# Patient Record
Sex: FEMALE | Race: WHITE | NOT HISPANIC OR LATINO | Employment: OTHER | URBAN - METROPOLITAN AREA
[De-identification: names, ages, dates, MRNs, and addresses within clinical notes are randomized per-mention and may not be internally consistent; named-entity substitution may affect disease eponyms.]

---

## 2023-03-16 ENCOUNTER — OFFICE VISIT (OUTPATIENT)
Dept: PRIMARY CARE | Facility: CLINIC | Age: 63
End: 2023-03-16
Payer: COMMERCIAL

## 2023-03-16 VITALS
BODY MASS INDEX: 18.39 KG/M2 | SYSTOLIC BLOOD PRESSURE: 120 MMHG | TEMPERATURE: 97 F | WEIGHT: 128.47 LBS | HEART RATE: 87 BPM | DIASTOLIC BLOOD PRESSURE: 78 MMHG | HEIGHT: 70 IN | OXYGEN SATURATION: 98 % | RESPIRATION RATE: 16 BRPM

## 2023-03-16 DIAGNOSIS — R59.0 CERVICAL LYMPHADENOPATHY: Primary | ICD-10-CM

## 2023-03-16 DIAGNOSIS — Z00.00 PHYSICAL EXAM: ICD-10-CM

## 2023-03-16 DIAGNOSIS — R39.9 URINARY TRACT INFECTION SYMPTOMS: ICD-10-CM

## 2023-03-16 LAB
POC APPEARANCE, URINE: CLEAR
POC BILIRUBIN, URINE: NEGATIVE
POC BLOOD, URINE: NEGATIVE
POC COLOR, URINE: YELLOW
POC GLUCOSE, URINE: NEGATIVE MG/DL
POC KETONES, URINE: NEGATIVE MG/DL
POC LEUKOCYTES, URINE: NEGATIVE
POC NITRITE,URINE: NEGATIVE
POC PH, URINE: 7 PH
POC PROTEIN, URINE: NEGATIVE MG/DL
POC SPECIFIC GRAVITY, URINE: 1.01
POC UROBILINOGEN, URINE: 0.2 EU/DL

## 2023-03-16 PROCEDURE — 1036F TOBACCO NON-USER: CPT | Performed by: NURSE PRACTITIONER

## 2023-03-16 PROCEDURE — 81003 URINALYSIS AUTO W/O SCOPE: CPT | Performed by: NURSE PRACTITIONER

## 2023-03-16 PROCEDURE — 99204 OFFICE O/P NEW MOD 45 MIN: CPT | Performed by: NURSE PRACTITIONER

## 2023-03-16 RX ORDER — DULOXETIN HYDROCHLORIDE 60 MG/1
1 CAPSULE, DELAYED RELEASE ORAL DAILY
COMMUNITY
Start: 2022-12-02

## 2023-03-16 RX ORDER — AZITHROMYCIN 250 MG/1
TABLET, FILM COATED ORAL
Qty: 6 TABLET | Refills: 0 | Status: SHIPPED | OUTPATIENT
Start: 2023-03-16 | End: 2023-03-21

## 2023-03-16 RX ORDER — CLONAZEPAM 0.5 MG/1
TABLET ORAL
COMMUNITY
Start: 2022-12-02

## 2023-03-16 RX ORDER — MELATONIN 10 MG
TABLET, SUBLINGUAL SUBLINGUAL
COMMUNITY

## 2023-03-16 RX ORDER — ASPIRIN 81 MG/1
81 TABLET ORAL DAILY
COMMUNITY

## 2023-03-16 RX ORDER — HYDROXYZINE HYDROCHLORIDE 25 MG/1
1 TABLET, FILM COATED ORAL
COMMUNITY
Start: 2022-05-14

## 2023-03-16 RX ORDER — BUPROPION HYDROCHLORIDE 150 MG/1
150 TABLET ORAL DAILY
COMMUNITY

## 2023-03-16 RX ORDER — DULOXETIN HYDROCHLORIDE 30 MG/1
1 CAPSULE, DELAYED RELEASE ORAL DAILY
COMMUNITY
Start: 2022-12-27

## 2023-03-16 ASSESSMENT — ENCOUNTER SYMPTOMS
SORE THROAT: 0
FREQUENCY: 0
COUGH: 0
CHILLS: 0
FEVER: 0
NECK PAIN: 1

## 2023-03-16 ASSESSMENT — PAIN SCALES - GENERAL: PAINLEVEL: 8

## 2023-03-16 NOTE — PROGRESS NOTES
Subjective   Patient ID: Coretta Bird is a 62 y.o. female who presents for Establish Care, Neck Pain, and Swollen Glands.    Neck Pain   Pertinent negatives include no fever.     Patient presents to clinic to establish care she states last seen by PCP at [facility] x1 year ago.      Patient with past medical history of Anxiety depression, DVT unsure which leg approximately 10 years ago.    Patient states her   approximately 14 months ago.    Patient currently being followed by psychiatry managing her anxiety depression every 3 months at an outside facility.    Today complains of swollen lymph nodes in the neck and chin x5 days.  She denies any recent cough or cold symptoms fevers sore throat or any other symptoms.  She states she thought this lymph node swelling was related to her teeth went to see a dentist and states dentist did not find any problems with  teeth.   Patient has already been evaluated in an express care center at [facility] for cervical lymphadenopathy and advised follow-up.    Patient also complains of chronic neck pain with feeling of pins-and-needles in her arm.  She states she was previously seen by orthopedics but declines referral at this time due to insurance reasons.    Today patient also complains of a dark color to her urine x2 days.  She denies dysuria or frequency.    Last mammogram-  Last ytwuugvtsdu-wr-pk-date per patient within the last 10 years.  Shingles vaccine-declines  Influenza vaccine-declines  Covid vaccine-Per patient moderna x2 vaccines x2 booster  Tdap-up-to-date per patient     Review of Systems   Constitutional:  Negative for chills and fever.   HENT:  Negative for sore throat.    Respiratory:  Negative for cough.    Genitourinary:  Negative for frequency, pelvic pain and urgency.        Discolored dark urine.   Musculoskeletal:  Positive for neck pain.   Skin: Negative.        Objective   /78   Pulse 87   Temp 36.1 °C (97 °F) (Temporal)   Resp 16   Ht 1.778  "m (5' 10\")   Wt 58.3 kg (128 lb 7.5 oz)   SpO2 98%   BMI 18.43 kg/m²     Physical Exam  Constitutional:       Appearance: Normal appearance.   HENT:      Right Ear: Tympanic membrane normal.      Left Ear: Tympanic membrane normal.      Mouth/Throat:      Mouth: Mucous membranes are moist.      Pharynx: No oropharyngeal exudate or posterior oropharyngeal erythema.   Cardiovascular:      Rate and Rhythm: Normal rate and regular rhythm.   Pulmonary:      Effort: Pulmonary effort is normal.      Breath sounds: Normal breath sounds.   Abdominal:      General: Abdomen is flat.      Palpations: Abdomen is soft.   Musculoskeletal:         General: Normal range of motion.      Cervical back: Normal range of motion and neck supple.   Lymphadenopathy:      Head:      Right side of head: Tonsillar adenopathy present. No submental, submandibular, preauricular or posterior auricular adenopathy.      Left side of head: Tonsillar adenopathy present. No submental, submandibular, preauricular or posterior auricular adenopathy.   Skin:     General: Skin is warm and dry.   Neurological:      Mental Status: She is alert.         Assessment/Plan   Problem List Items Addressed This Visit    None  Visit Diagnoses       Cervical lymphadenopathy    -  Primary    Relevant Medications    azithromycin (Zithromax) 250 mg tablet-Pt. States she has previously tolerated without adverse effects    Other Relevant Orders    CBC and Auto Differential    Physical exam        Relevant Orders    Comprehensive Metabolic Panel    Lipid Panel    Vitamin D 25 hydroxy    Tsh With Reflex To Free T4 If Abnormal    Hemoglobin A1C    POCT UA Microscopy manually resulted    Urinary tract infection symptoms        Relevant Orders    POCT UA Microscopy manually resulted                   -U-Dip negative.            "

## 2023-03-16 NOTE — PATIENT INSTRUCTIONS
Lymph nodes can become swollen if you have an infection, some other swelling, or even because of a tumor. If you have an infection in a lymph node it is most often because there is an infection somewhere else in your body. Some drugs or illnesses can cause you to have swollen lymph nodes.      Have labs drawn.  Your urine in the office is negative.  Take Antibiotic as prescribed.  Tylenol if needed.  Follow-up 1 month.  Call office if any new concerns

## 2023-03-18 ENCOUNTER — LAB (OUTPATIENT)
Dept: LAB | Facility: LAB | Age: 63
End: 2023-03-18
Payer: COMMERCIAL

## 2023-03-18 DIAGNOSIS — Z00.00 PHYSICAL EXAM: ICD-10-CM

## 2023-03-18 DIAGNOSIS — R59.0 CERVICAL LYMPHADENOPATHY: ICD-10-CM

## 2023-03-18 LAB
ALANINE AMINOTRANSFERASE (SGPT) (U/L) IN SER/PLAS: 13 U/L (ref 7–45)
ALBUMIN (G/DL) IN SER/PLAS: 4.2 G/DL (ref 3.4–5)
ALKALINE PHOSPHATASE (U/L) IN SER/PLAS: 69 U/L (ref 33–136)
ANION GAP IN SER/PLAS: 9 MMOL/L (ref 10–20)
ASPARTATE AMINOTRANSFERASE (SGOT) (U/L) IN SER/PLAS: 18 U/L (ref 9–39)
BASOPHILS (10*3/UL) IN BLOOD BY AUTOMATED COUNT: 0.07 X10E9/L (ref 0–0.1)
BASOPHILS/100 LEUKOCYTES IN BLOOD BY AUTOMATED COUNT: 1.5 % (ref 0–2)
BILIRUBIN TOTAL (MG/DL) IN SER/PLAS: 1 MG/DL (ref 0–1.2)
CALCIDIOL (25 OH VITAMIN D3) (NG/ML) IN SER/PLAS: 49 NG/ML
CALCIUM (MG/DL) IN SER/PLAS: 9.5 MG/DL (ref 8.6–10.3)
CARBON DIOXIDE, TOTAL (MMOL/L) IN SER/PLAS: 31 MMOL/L (ref 21–32)
CHLORIDE (MMOL/L) IN SER/PLAS: 107 MMOL/L (ref 98–107)
CHOLESTEROL (MG/DL) IN SER/PLAS: 250 MG/DL (ref 0–199)
CHOLESTEROL IN HDL (MG/DL) IN SER/PLAS: 74.7 MG/DL
CHOLESTEROL/HDL RATIO: 3.3
CREATININE (MG/DL) IN SER/PLAS: 0.85 MG/DL (ref 0.5–1.05)
EOSINOPHILS (10*3/UL) IN BLOOD BY AUTOMATED COUNT: 0.26 X10E9/L (ref 0–0.7)
EOSINOPHILS/100 LEUKOCYTES IN BLOOD BY AUTOMATED COUNT: 5.6 % (ref 0–6)
ERYTHROCYTE DISTRIBUTION WIDTH (RATIO) BY AUTOMATED COUNT: 12.9 % (ref 11.5–14.5)
ERYTHROCYTE MEAN CORPUSCULAR HEMOGLOBIN CONCENTRATION (G/DL) BY AUTOMATED: 31.9 G/DL (ref 32–36)
ERYTHROCYTE MEAN CORPUSCULAR VOLUME (FL) BY AUTOMATED COUNT: 94 FL (ref 80–100)
ERYTHROCYTES (10*6/UL) IN BLOOD BY AUTOMATED COUNT: 4.33 X10E12/L (ref 4–5.2)
GFR FEMALE: 76 ML/MIN/1.73M2
GLUCOSE (MG/DL) IN SER/PLAS: 95 MG/DL (ref 74–99)
HEMATOCRIT (%) IN BLOOD BY AUTOMATED COUNT: 40.7 % (ref 36–46)
HEMOGLOBIN (G/DL) IN BLOOD: 13 G/DL (ref 12–16)
IMMATURE GRANULOCYTES/100 LEUKOCYTES IN BLOOD BY AUTOMATED COUNT: 0.2 % (ref 0–0.9)
LDL: 155 MG/DL (ref 0–99)
LEUKOCYTES (10*3/UL) IN BLOOD BY AUTOMATED COUNT: 4.6 X10E9/L (ref 4.4–11.3)
LYMPHOCYTES (10*3/UL) IN BLOOD BY AUTOMATED COUNT: 1.55 X10E9/L (ref 1.2–4.8)
LYMPHOCYTES/100 LEUKOCYTES IN BLOOD BY AUTOMATED COUNT: 33.5 % (ref 13–44)
MONOCYTES (10*3/UL) IN BLOOD BY AUTOMATED COUNT: 0.34 X10E9/L (ref 0.1–1)
MONOCYTES/100 LEUKOCYTES IN BLOOD BY AUTOMATED COUNT: 7.3 % (ref 2–10)
NEUTROPHILS (10*3/UL) IN BLOOD BY AUTOMATED COUNT: 2.4 X10E9/L (ref 1.2–7.7)
NEUTROPHILS/100 LEUKOCYTES IN BLOOD BY AUTOMATED COUNT: 51.9 % (ref 40–80)
NRBC (PER 100 WBCS) BY AUTOMATED COUNT: 0 /100 WBC (ref 0–0)
PLATELETS (10*3/UL) IN BLOOD AUTOMATED COUNT: 309 X10E9/L (ref 150–450)
POTASSIUM (MMOL/L) IN SER/PLAS: 5.2 MMOL/L (ref 3.5–5.3)
PROTEIN TOTAL: 6.8 G/DL (ref 6.4–8.2)
SODIUM (MMOL/L) IN SER/PLAS: 142 MMOL/L (ref 136–145)
THYROTROPIN (MIU/L) IN SER/PLAS BY DETECTION LIMIT <= 0.05 MIU/L: 6.52 MIU/L (ref 0.44–3.98)
THYROXINE (T4) FREE (NG/DL) IN SER/PLAS: 0.58 NG/DL (ref 0.61–1.12)
TRIGLYCERIDE (MG/DL) IN SER/PLAS: 104 MG/DL (ref 0–149)
UREA NITROGEN (MG/DL) IN SER/PLAS: 12 MG/DL (ref 6–23)
VLDL: 21 MG/DL (ref 0–40)

## 2023-03-18 PROCEDURE — 80053 COMPREHEN METABOLIC PANEL: CPT

## 2023-03-18 PROCEDURE — 84443 ASSAY THYROID STIM HORMONE: CPT

## 2023-03-18 PROCEDURE — 82306 VITAMIN D 25 HYDROXY: CPT

## 2023-03-18 PROCEDURE — 84439 ASSAY OF FREE THYROXINE: CPT

## 2023-03-18 PROCEDURE — 80061 LIPID PANEL: CPT

## 2023-03-18 PROCEDURE — 36415 COLL VENOUS BLD VENIPUNCTURE: CPT

## 2023-03-18 PROCEDURE — 83036 HEMOGLOBIN GLYCOSYLATED A1C: CPT

## 2023-03-18 PROCEDURE — 85025 COMPLETE CBC W/AUTO DIFF WBC: CPT

## 2023-03-19 LAB
ESTIMATED AVERAGE GLUCOSE FOR HBA1C: 117 MG/DL
HEMOGLOBIN A1C/HEMOGLOBIN TOTAL IN BLOOD: 5.7 %

## 2023-03-20 ENCOUNTER — TELEPHONE (OUTPATIENT)
Dept: PRIMARY CARE | Facility: CLINIC | Age: 63
End: 2023-03-20
Payer: COMMERCIAL

## 2023-03-20 DIAGNOSIS — R79.89 ABNORMAL THYROID BLOOD TEST: Primary | ICD-10-CM

## 2023-03-20 NOTE — TELEPHONE ENCOUNTER
Called patient to discuss lab results.  Patient with abnormal TSH and T4.  Patient states she is had previous history of abnormal thyroid levels seen by [facility] -she states was undecided whether or not to start her on thyroid medication.  Patient will be referred to endocrinology for further evaluation.  Some abnormalities also noted with cholesterol level we discussed healthy lifestyle changes with low-cholesterol low-fat diet focusing on plant-based foods and exercises.

## 2024-11-22 ENCOUNTER — APPOINTMENT (OUTPATIENT)
Dept: CARDIOLOGY | Facility: HOSPITAL | Age: 64
End: 2024-11-22
Payer: MEDICARE

## 2024-11-22 ENCOUNTER — HOSPITAL ENCOUNTER (EMERGENCY)
Facility: HOSPITAL | Age: 64
Discharge: HOME | End: 2024-11-22
Attending: EMERGENCY MEDICINE
Payer: MEDICARE

## 2024-11-22 ENCOUNTER — APPOINTMENT (OUTPATIENT)
Dept: RADIOLOGY | Facility: HOSPITAL | Age: 64
End: 2024-11-22
Payer: MEDICARE

## 2024-11-22 VITALS
HEIGHT: 70 IN | BODY MASS INDEX: 19.39 KG/M2 | TEMPERATURE: 97.7 F | OXYGEN SATURATION: 99 % | HEART RATE: 85 BPM | SYSTOLIC BLOOD PRESSURE: 133 MMHG | DIASTOLIC BLOOD PRESSURE: 70 MMHG | WEIGHT: 135.4 LBS | RESPIRATION RATE: 18 BRPM

## 2024-11-22 DIAGNOSIS — R42 VERTIGO: Primary | ICD-10-CM

## 2024-11-22 LAB
ALBUMIN SERPL BCP-MCNC: 4.4 G/DL (ref 3.4–5)
ALP SERPL-CCNC: 79 U/L (ref 33–136)
ALT SERPL W P-5'-P-CCNC: 10 U/L (ref 7–45)
ANION GAP SERPL CALC-SCNC: 10 MMOL/L (ref 10–20)
AST SERPL W P-5'-P-CCNC: 15 U/L (ref 9–39)
BASOPHILS # BLD AUTO: 0.05 X10*3/UL (ref 0–0.1)
BASOPHILS NFR BLD AUTO: 1.1 %
BILIRUB SERPL-MCNC: 0.9 MG/DL (ref 0–1.2)
BUN SERPL-MCNC: 10 MG/DL (ref 6–23)
CALCIUM SERPL-MCNC: 9.4 MG/DL (ref 8.6–10.3)
CARDIAC TROPONIN I PNL SERPL HS: <3 NG/L (ref 0–13)
CHLORIDE SERPL-SCNC: 100 MMOL/L (ref 98–107)
CO2 SERPL-SCNC: 30 MMOL/L (ref 21–32)
CREAT SERPL-MCNC: 0.86 MG/DL (ref 0.5–1.05)
EGFRCR SERPLBLD CKD-EPI 2021: 75 ML/MIN/1.73M*2
EOSINOPHIL # BLD AUTO: 0.07 X10*3/UL (ref 0–0.7)
EOSINOPHIL NFR BLD AUTO: 1.5 %
ERYTHROCYTE [DISTWIDTH] IN BLOOD BY AUTOMATED COUNT: 12.3 % (ref 11.5–14.5)
GLUCOSE SERPL-MCNC: 58 MG/DL (ref 74–99)
HCT VFR BLD AUTO: 40.1 % (ref 36–46)
HGB BLD-MCNC: 13.6 G/DL (ref 12–16)
IMM GRANULOCYTES # BLD AUTO: 0.02 X10*3/UL (ref 0–0.7)
IMM GRANULOCYTES NFR BLD AUTO: 0.4 % (ref 0–0.9)
LYMPHOCYTES # BLD AUTO: 1.06 X10*3/UL (ref 1.2–4.8)
LYMPHOCYTES NFR BLD AUTO: 22.3 %
MAGNESIUM SERPL-MCNC: 2.03 MG/DL (ref 1.6–2.4)
MCH RBC QN AUTO: 31.4 PG (ref 26–34)
MCHC RBC AUTO-ENTMCNC: 33.9 G/DL (ref 32–36)
MCV RBC AUTO: 93 FL (ref 80–100)
MONOCYTES # BLD AUTO: 0.38 X10*3/UL (ref 0.1–1)
MONOCYTES NFR BLD AUTO: 8 %
NEUTROPHILS # BLD AUTO: 3.17 X10*3/UL (ref 1.2–7.7)
NEUTROPHILS NFR BLD AUTO: 66.7 %
NRBC BLD-RTO: 0 /100 WBCS (ref 0–0)
PLATELET # BLD AUTO: 273 X10*3/UL (ref 150–450)
POTASSIUM SERPL-SCNC: 3.7 MMOL/L (ref 3.5–5.3)
PROT SERPL-MCNC: 7.3 G/DL (ref 6.4–8.2)
RBC # BLD AUTO: 4.33 X10*6/UL (ref 4–5.2)
SODIUM SERPL-SCNC: 136 MMOL/L (ref 136–145)
WBC # BLD AUTO: 4.8 X10*3/UL (ref 4.4–11.3)

## 2024-11-22 PROCEDURE — 70496 CT ANGIOGRAPHY HEAD: CPT | Performed by: RADIOLOGY

## 2024-11-22 PROCEDURE — 70498 CT ANGIOGRAPHY NECK: CPT | Performed by: RADIOLOGY

## 2024-11-22 PROCEDURE — 99285 EMERGENCY DEPT VISIT HI MDM: CPT | Mod: 25

## 2024-11-22 PROCEDURE — 93005 ELECTROCARDIOGRAM TRACING: CPT

## 2024-11-22 PROCEDURE — 36415 COLL VENOUS BLD VENIPUNCTURE: CPT | Performed by: EMERGENCY MEDICINE

## 2024-11-22 PROCEDURE — 84484 ASSAY OF TROPONIN QUANT: CPT | Performed by: EMERGENCY MEDICINE

## 2024-11-22 PROCEDURE — 70450 CT HEAD/BRAIN W/O DYE: CPT

## 2024-11-22 PROCEDURE — 2550000001 HC RX 255 CONTRASTS: Performed by: EMERGENCY MEDICINE

## 2024-11-22 PROCEDURE — 70450 CT HEAD/BRAIN W/O DYE: CPT | Performed by: RADIOLOGY

## 2024-11-22 PROCEDURE — 83735 ASSAY OF MAGNESIUM: CPT | Performed by: EMERGENCY MEDICINE

## 2024-11-22 PROCEDURE — 70498 CT ANGIOGRAPHY NECK: CPT

## 2024-11-22 PROCEDURE — 85025 COMPLETE CBC W/AUTO DIFF WBC: CPT | Performed by: EMERGENCY MEDICINE

## 2024-11-22 PROCEDURE — 80053 COMPREHEN METABOLIC PANEL: CPT | Performed by: EMERGENCY MEDICINE

## 2024-11-22 ASSESSMENT — COLUMBIA-SUICIDE SEVERITY RATING SCALE - C-SSRS
6. HAVE YOU EVER DONE ANYTHING, STARTED TO DO ANYTHING, OR PREPARED TO DO ANYTHING TO END YOUR LIFE?: NO
1. SINCE LAST CONTACT, HAVE YOU WISHED YOU WERE DEAD OR WISHED YOU COULD GO TO SLEEP AND NOT WAKE UP?: NO
2. HAVE YOU ACTUALLY HAD ANY THOUGHTS OF KILLING YOURSELF?: NO
2. HAVE YOU ACTUALLY HAD ANY THOUGHTS OF KILLING YOURSELF?: NO
6. HAVE YOU EVER DONE ANYTHING, STARTED TO DO ANYTHING, OR PREPARED TO DO ANYTHING TO END YOUR LIFE?: NO
1. IN THE PAST MONTH, HAVE YOU WISHED YOU WERE DEAD OR WISHED YOU COULD GO TO SLEEP AND NOT WAKE UP?: NO

## 2024-11-22 ASSESSMENT — LIFESTYLE VARIABLES
TOTAL SCORE: 0
EVER HAD A DRINK FIRST THING IN THE MORNING TO STEADY YOUR NERVES TO GET RID OF A HANGOVER: NO
HAVE PEOPLE ANNOYED YOU BY CRITICIZING YOUR DRINKING: NO
EVER FELT BAD OR GUILTY ABOUT YOUR DRINKING: NO
HAVE YOU EVER FELT YOU SHOULD CUT DOWN ON YOUR DRINKING: NO

## 2024-11-22 ASSESSMENT — PAIN DESCRIPTION - PAIN TYPE: TYPE: ACUTE PAIN

## 2024-11-22 ASSESSMENT — PAIN SCALES - GENERAL: PAINLEVEL_OUTOF10: 0 - NO PAIN

## 2024-11-22 ASSESSMENT — PAIN - FUNCTIONAL ASSESSMENT: PAIN_FUNCTIONAL_ASSESSMENT: 0-10

## 2024-11-22 NOTE — ED PROVIDER NOTES
"HPI   Chief Complaint   Patient presents with    Dizziness     Patient present to the emergency room c/o  dizziness x3 days. States she has been unable to walk, and states she is stumbling over. Denies falls but states she is \"just not feeling well\". Denies CP       HPI  Patient is a 64-year-old female sent to the ED today from urgent care for evaluation of dizziness.  Patient explains that her dizziness started 3 days ago.  She describes room spinning dizziness with associated nausea.  She called her PCP at that time and was started on meclizine.  Since then, her symptoms have improved significantly.  Patient notes that at the onset of her symptoms 3 days ago, she was unable to get up or walk around at all due to significant the room spinning dizziness, and a friend had to  her medication for her.  Today, she is ambulatory without difficulty, she drove herself to urgent care, and drove herself from urgent care today here.  She does note still \"feeling off,\" particularly when looking from side to side.  However, she denies any significant dizziness currently.  She denies any focal numbness, weakness, or tingling.  She notes that she did have a previous history of vertigo many years ago, but has not had any problems since then.  Patient also notes that the day before her symptoms started, she had a massage with a lot of manipulation of her neck.  She otherwise has no additional concerns      Patient History   Past Medical History:   Diagnosis Date    Personal history of other diseases of the musculoskeletal system and connective tissue     History of arthritis    Personal history of other diseases of the musculoskeletal system and connective tissue     History of backache    Personal history of other mental and behavioral disorders     History of depression     Past Surgical History:   Procedure Laterality Date    APPENDECTOMY  11/08/2016    Appendectomy    VARICOSE VEIN SURGERY  11/08/2016    Varicose Vein " Ligation     No family history on file.  Social History     Tobacco Use    Smoking status: Former     Types: Cigarettes    Smokeless tobacco: Never   Vaping Use    Vaping status: Never Used   Substance Use Topics    Alcohol use: Not Currently    Drug use: Never       Physical Exam   ED Triage Vitals   Temperature Heart Rate Respirations BP   11/22/24 1131 11/22/24 1131 11/22/24 1131 11/22/24 1131   36.8 °C (98.2 °F) 87 16 163/79      Pulse Ox Temp Source Heart Rate Source Patient Position   11/22/24 1131 11/22/24 1159 11/22/24 1131 11/22/24 1131   100 % Tympanic Monitor Sitting      BP Location FiO2 (%)     11/22/24 1131 --     Right arm        Physical Exam  Vitals and nursing note reviewed.   Constitutional:       General: She is not in acute distress.     Appearance: She is not toxic-appearing.   HENT:      Head: Normocephalic.      Mouth/Throat:      Mouth: Mucous membranes are moist.   Eyes:      Extraocular Movements: Extraocular movements intact.      Conjunctiva/sclera: Conjunctivae normal.   Cardiovascular:      Rate and Rhythm: Normal rate and regular rhythm.      Pulses: Normal pulses.   Pulmonary:      Effort: Pulmonary effort is normal. No respiratory distress.      Breath sounds: Normal breath sounds.   Abdominal:      General: There is no distension.      Palpations: Abdomen is soft.      Tenderness: There is no abdominal tenderness.   Musculoskeletal:         General: No swelling.      Cervical back: Neck supple.   Skin:     General: Skin is warm and dry.      Capillary Refill: Capillary refill takes less than 2 seconds.   Neurological:      General: No focal deficit present.      Mental Status: She is alert. Mental status is at baseline.      Comments: This patient is awake, alert and oriented to person, place and time. Speech is clear and fluent. Cranial nerves II-XII are grossly intact. Strength and sensation are intact in all extremities. No limb ataxia. No pronator drift. No dysmetria detected  on finger-to-nose test. Negative Romberg's. Stable gait. NIHSS 0.           ED Course & MDM   Diagnoses as of 11/22/24 1707   Vertigo           No data recorded     Deangelo Coma Scale Score: 15 (11/22/24 1200 : Carito Rodrigues RN)                         Medical Decision Making  Patient was seen and evaluated for dizziness.  Patient's dizziness has improved significantly over the past couple of days after being started on meclizine.  On exam, patient has no focal neurological deficits.  She is ambulatory without difficulty and drove herself to the ED today  Given history of recent neck manipulation however, additional lab work and imaging are ordered for further evaluation    CBC is unremarkable.  CMP is unremarkable.  Magnesium is normal at 2.  High-sensitivity troponin is negative  CT angio head and neck w and wo IV contrast   Final Result   CTA neck:        No evidence for significant stenosis of the cervical vessels. No   evidence of dissection.        The ascending aorta measures 4.0 cm in AP dimension. Similar to CT   cardiac scoring exam 09/15/2022.        CTA head:        No evidence for significant stenosis or large branch vessel cutoffs   of the intracranial vessels.        MACRO:   None        Signed by: Michaela Kelly 11/22/2024 1:29 PM   Dictation workstation:   [id number]      CT head wo IV contrast   Final Result   No acute intracranial abnormality.             MACRO:   none        Signed by: Shelly Markham 11/22/2024 2:41 PM   Dictation workstation:   [id number]        On reevaluation, patient is resting comfortably in bed. Patient was informed of their lab and imaging results, and all questions and concerns were answered. Discharge planning with close outpatient follow-up was discussed at this time, to which the patient was agreeable. Strict return precautions were given, and patient was discharged home in stable condition.      Procedure  Procedures     Charleen ESCOBEDO MD  11/22/24 7080

## 2024-11-22 NOTE — ED TRIAGE NOTES
"Patient present to the emergency room c/o  dizziness x3 days. States she has been unable to walk, and states she is stumbling over. Denies falls but states she is \"just not feeling well\". Denies CP  "
no dizziness, blood in stool, melena, diarrhea, dysuria, vaginal bleeding/discharge

## 2024-11-28 LAB
ATRIAL RATE: 86 BPM
P AXIS: 68 DEGREES
P OFFSET: 213 MS
P ONSET: 148 MS
PR INTERVAL: 144 MS
Q ONSET: 220 MS
QRS COUNT: 14 BEATS
QRS DURATION: 76 MS
QT INTERVAL: 368 MS
QTC CALCULATION(BAZETT): 440 MS
QTC FREDERICIA: 415 MS
R AXIS: 60 DEGREES
T AXIS: 62 DEGREES
T OFFSET: 404 MS
VENTRICULAR RATE: 86 BPM

## 2025-06-19 ENCOUNTER — OFFICE VISIT (OUTPATIENT)
Dept: URGENT CARE | Age: 65
End: 2025-06-19
Payer: MEDICARE

## 2025-06-19 VITALS
RESPIRATION RATE: 22 BRPM | HEART RATE: 85 BPM | TEMPERATURE: 98.2 F | DIASTOLIC BLOOD PRESSURE: 93 MMHG | SYSTOLIC BLOOD PRESSURE: 150 MMHG | OXYGEN SATURATION: 99 %
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
## 2025-06-19 DIAGNOSIS — S61.259A INFECTED CAT BITE OF FINGER, INITIAL ENCOUNTER: Primary | ICD-10-CM

## 2025-06-19 DIAGNOSIS — L08.9 INFECTED CAT BITE OF FINGER, INITIAL ENCOUNTER: Primary | ICD-10-CM

## 2025-06-19 RX ORDER — ONDANSETRON 4 MG/1
4 TABLET, ORALLY DISINTEGRATING ORAL EVERY 12 HOURS PRN
Qty: 10 TABLET | Refills: 0 | Status: ON HOLD | OUTPATIENT
Start: 2025-06-19 | End: 2025-06-24

## 2025-06-19 RX ORDER — DOXYCYCLINE 100 MG/1
100 CAPSULE ORAL 2 TIMES DAILY
Qty: 20 CAPSULE | Refills: 0 | Status: ON HOLD | OUTPATIENT
Start: 2025-06-19 | End: 2025-06-29

## 2025-06-19 RX ORDER — DOXYCYCLINE HYCLATE 100 MG
100 TABLET ORAL 2 TIMES DAILY
Qty: 20 TABLET | Refills: 0 | Status: SHIPPED | OUTPATIENT
Start: 2025-06-19 | End: 2025-06-19

## 2025-06-19 ASSESSMENT — PAIN SCALES - GENERAL: PAINLEVEL_OUTOF10: 0-NO PAIN

## 2025-06-19 NOTE — PATIENT INSTRUCTIONS
clean area with plain soap and water  apply light layer of bacitracin or Vaseline.   Cover area with non-stick pad if draining, otherwise leave open to air.  Monitor for signs of infection such as surrounding redness, swelling, warmth, red streaking, pus like discharge, fevers, chills. If these occur F/U with pcp or RTC.

## 2025-06-19 NOTE — PROGRESS NOTES
Subjective   Patient ID: Coretta Bird is a 65 y.o. female. They present today with a chief complaint of Injury (Right 3rd finger cat bite ).    History of Present Illness    Injury    65-year-old patient presents to clinic with complaints of multiple puncture wounds on the proximal third digit of the right hand with associated edema and throbbing pain as well as serous drainage since sustaining cat bites from a stray cat today.  Reports has also noted some tingling in the area.  Reports in the past had a cat bite that required IV antibiotics.  Reports no history of MRSA.  Tetanus is up-to-date.   Denies fevers, chills, induration, streaking, purulent drainage, rashes.    Past Medical History  Allergies as of 06/19/2025 - Reviewed 06/19/2025   Allergen Reaction Noted    Doxycycline Other 03/16/2023    Erythromycin Other 03/16/2023    Penicillins Rash 03/16/2023    Sulfamethoxazole Rash 03/16/2023       Prescriptions Prior to Admission[1]     Medical History[2]    Surgical History[3]     reports that she has quit smoking. Her smoking use included cigarettes. She has never used smokeless tobacco. She reports that she does not currently use alcohol. She reports that she does not use drugs.    Review of Systems  Review of Systems     ROS negative with the exception as noted on HPI                            Objective    Vitals:    06/19/25 1511   BP: (!) 150/93   Pulse: 85   Resp: 22   Temp: 36.8 °C (98.2 °F)   SpO2: 99%     No LMP recorded. Patient is postmenopausal.    Physical Exam  Constitutional:       Appearance: Normal appearance.   HENT:      Head: Normocephalic and atraumatic.   Cardiovascular:      Rate and Rhythm: Normal rate and regular rhythm.      Pulses: Normal pulses.      Heart sounds: Normal heart sounds.   Pulmonary:      Effort: Pulmonary effort is normal. No respiratory distress.      Breath sounds: Normal breath sounds. No stridor. No wheezing, rhonchi or rales.   Musculoskeletal:      Right  forearm: Normal.      Left forearm: Normal.      Right wrist: No swelling, tenderness, bony tenderness, snuff box tenderness or crepitus. Normal range of motion. Normal pulse.      Left wrist: No swelling, tenderness, bony tenderness, snuff box tenderness or crepitus. Normal range of motion. Normal pulse.      Right hand: Swelling (pip and proximal phalanx. there are 3 puncture wounds at palmar side of the pip and 1 punture wound lateral pip. lacated on 3rd digit.) and tenderness (pip and proximal phalanx of 3rd digit) present. No bony tenderness. Decreased range of motion (3rd digit due to pain and edema). Decreased strength ( 4/5). Normal sensation. There is no disruption of two-point discrimination. Normal capillary refill. Normal pulse.      Left hand: No swelling, tenderness or bony tenderness. Normal range of motion. Normal strength. Normal sensation. There is no disruption of two-point discrimination. Normal capillary refill. Normal pulse.      Comments: Warm to palpation of proximal 3rd digit of the right hand.   Neurological:      Mental Status: She is alert.      Sensory: No sensory deficit.      Motor: No weakness.      Deep Tendon Reflexes: Reflexes normal.         Procedures    Point of Care Test & Imaging Results from this visit  No results found for this visit on 06/19/25.   Imaging  No results found.    Cardiology, Vascular, and Other Imaging  No other imaging results found for the past 2 days      Diagnostic study results (if any) were reviewed by Julieta Loza PA-C.    Assessment/Plan   Allergies, medications, history, and pertinent labs/EKGs/Imaging reviewed by Julieta Loza PA-C.   multiple puncture wounds on the proximal third digit of the right hand with associated edema and throbbing pain as well as serous drainage since sustaining cat bites from a stray cat today.  Discussed with patient will need to do antibiotics for infection.   Patient has allergies to doxycycline,  Bactrim, penicillin, erythromycin.   Allergy to doxycycline is listed as nausea and GI distress.   Discussed with patient based on allergies treatment options include levofloxacin/Cipro versus doxycycline with Zofran to mitigate side effects.   Discussed levofloxacin black box warnings with patient and pt. Preferred to do doxycycline. Zofran started for side effects of doxy. Tetanus is up to date. Pt. Is advised to clean area with plain soap and water, apply light layer of bacitracin. Cover area if draining, otherwise leave open to air. Monitor for signs of infection such as surrounding erythema, edema, warmth, streaking, purulent drainage, fevers, chills. If these occur F/U with pcp. Medications side effects discussed with pt. Illness progression and treatment options discussed with pt. Pt. expressed understanding and is an agreement with plan of care.   Medical Decision Making      Orders and Diagnoses  Diagnoses and all orders for this visit:  Infected cat bite of finger, initial encounter  -     ondansetron ODT (Zofran-ODT) 4 mg disintegrating tablet; Dissolve 1 tablet (4 mg) in the mouth every 12 hours if needed for nausea or vomiting for up to 5 days.  -     doxycycline (Vibramycin) 100 mg capsule; Take 1 capsule (100 mg) by mouth 2 times a day for 10 days. Take with at least 8 ounces (large glass) of water, do not lie down for 30 minutes after      Medical Admin Record      Patient disposition: Home    Electronically signed by Julieta Loza PA-C  4:42 PM           [1] (Not in a hospital admission)   [2]   Past Medical History:  Diagnosis Date    Personal history of other diseases of the musculoskeletal system and connective tissue     History of arthritis    Personal history of other diseases of the musculoskeletal system and connective tissue     History of backache    Personal history of other mental and behavioral disorders     History of depression   [3]   Past Surgical History:  Procedure  Laterality Date    APPENDECTOMY  11/08/2016    Appendectomy    VARICOSE VEIN SURGERY  11/08/2016    Varicose Vein Ligation

## 2025-06-20 ENCOUNTER — APPOINTMENT (OUTPATIENT)
Dept: RADIOLOGY | Facility: HOSPITAL | Age: 65
DRG: 581 | End: 2025-06-20
Payer: MEDICARE

## 2025-06-20 ENCOUNTER — HOSPITAL ENCOUNTER (INPATIENT)
Facility: HOSPITAL | Age: 65
DRG: 581 | End: 2025-06-20
Attending: EMERGENCY MEDICINE | Admitting: INTERNAL MEDICINE
Payer: MEDICARE

## 2025-06-20 DIAGNOSIS — L03.011 CELLULITIS OF FINGER OF RIGHT HAND: ICD-10-CM

## 2025-06-20 DIAGNOSIS — S61.258A CAT BITE OF MIDDLE FINGER, INITIAL ENCOUNTER: Primary | ICD-10-CM
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
## 2025-06-20 LAB
ALBUMIN SERPL BCP-MCNC: 4.1 G/DL (ref 3.4–5)
ALP SERPL-CCNC: 66 U/L (ref 33–136)
ALT SERPL W P-5'-P-CCNC: 12 U/L (ref 7–45)
ANION GAP SERPL CALC-SCNC: 9 MMOL/L (ref 10–20)
AST SERPL W P-5'-P-CCNC: 19 U/L (ref 9–39)
BASOPHILS # BLD AUTO: 0.04 X10*3/UL (ref 0–0.1)
BASOPHILS NFR BLD AUTO: 0.5 %
BILIRUB SERPL-MCNC: 1.6 MG/DL (ref 0–1.2)
BUN SERPL-MCNC: 11 MG/DL (ref 6–23)
CALCIUM SERPL-MCNC: 9.1 MG/DL (ref 8.6–10.3)
CHLORIDE SERPL-SCNC: 102 MMOL/L (ref 98–107)
CO2 SERPL-SCNC: 29 MMOL/L (ref 21–32)
CREAT SERPL-MCNC: 0.8 MG/DL (ref 0.5–1.05)
CRP SERPL-MCNC: 0.79 MG/DL
EGFRCR SERPLBLD CKD-EPI 2021: 81 ML/MIN/1.73M*2
EOSINOPHIL # BLD AUTO: 0.03 X10*3/UL (ref 0–0.7)
EOSINOPHIL NFR BLD AUTO: 0.3 %
ERYTHROCYTE [DISTWIDTH] IN BLOOD BY AUTOMATED COUNT: 12.7 % (ref 11.5–14.5)
ERYTHROCYTE [SEDIMENTATION RATE] IN BLOOD BY WESTERGREN METHOD: 5 MM/H (ref 0–30)
GLUCOSE SERPL-MCNC: 100 MG/DL (ref 74–99)
HCT VFR BLD AUTO: 36.6 % (ref 36–46)
HGB BLD-MCNC: 12.1 G/DL (ref 12–16)
HOLD SPECIMEN: NORMAL
IMM GRANULOCYTES # BLD AUTO: 0.04 X10*3/UL (ref 0–0.7)
IMM GRANULOCYTES NFR BLD AUTO: 0.5 % (ref 0–0.9)
LYMPHOCYTES # BLD AUTO: 0.66 X10*3/UL (ref 1.2–4.8)
LYMPHOCYTES NFR BLD AUTO: 7.5 %
MCH RBC QN AUTO: 30.9 PG (ref 26–34)
MCHC RBC AUTO-ENTMCNC: 33.1 G/DL (ref 32–36)
MCV RBC AUTO: 94 FL (ref 80–100)
MONOCYTES # BLD AUTO: 0.75 X10*3/UL (ref 0.1–1)
MONOCYTES NFR BLD AUTO: 8.5 %
NEUTROPHILS # BLD AUTO: 7.28 X10*3/UL (ref 1.2–7.7)
NEUTROPHILS NFR BLD AUTO: 82.7 %
NRBC BLD-RTO: 0 /100 WBCS (ref 0–0)
PLATELET # BLD AUTO: 253 X10*3/UL (ref 150–450)
POTASSIUM SERPL-SCNC: 4.1 MMOL/L (ref 3.5–5.3)
PROT SERPL-MCNC: 6.7 G/DL (ref 6.4–8.2)
RBC # BLD AUTO: 3.91 X10*6/UL (ref 4–5.2)
SODIUM SERPL-SCNC: 136 MMOL/L (ref 136–145)
WBC # BLD AUTO: 8.8 X10*3/UL (ref 4.4–11.3)

## 2025-06-20 PROCEDURE — 2500000004 HC RX 250 GENERAL PHARMACY W/ HCPCS (ALT 636 FOR OP/ED): Performed by: INTERNAL MEDICINE

## 2025-06-20 PROCEDURE — 99285 EMERGENCY DEPT VISIT HI MDM: CPT | Mod: 25 | Performed by: EMERGENCY MEDICINE

## 2025-06-20 PROCEDURE — 73220 MRI UPPR EXTREMITY W/O&W/DYE: CPT | Mod: RT

## 2025-06-20 PROCEDURE — 85652 RBC SED RATE AUTOMATED: CPT

## 2025-06-20 PROCEDURE — 2550000001 HC RX 255 CONTRASTS: Performed by: EMERGENCY MEDICINE

## 2025-06-20 PROCEDURE — 36415 COLL VENOUS BLD VENIPUNCTURE: CPT

## 2025-06-20 PROCEDURE — 86140 C-REACTIVE PROTEIN: CPT

## 2025-06-20 PROCEDURE — A9575 INJ GADOTERATE MEGLUMI 0.1ML: HCPCS | Performed by: EMERGENCY MEDICINE

## 2025-06-20 PROCEDURE — 96365 THER/PROPH/DIAG IV INF INIT: CPT

## 2025-06-20 PROCEDURE — 99223 1ST HOSP IP/OBS HIGH 75: CPT | Performed by: INTERNAL MEDICINE

## 2025-06-20 PROCEDURE — 80053 COMPREHEN METABOLIC PANEL: CPT

## 2025-06-20 PROCEDURE — 85025 COMPLETE CBC W/AUTO DIFF WBC: CPT

## 2025-06-20 PROCEDURE — 2500000001 HC RX 250 WO HCPCS SELF ADMINISTERED DRUGS (ALT 637 FOR MEDICARE OP): Performed by: INTERNAL MEDICINE

## 2025-06-20 PROCEDURE — 96366 THER/PROPH/DIAG IV INF ADDON: CPT

## 2025-06-20 PROCEDURE — 1100000001 HC PRIVATE ROOM DAILY

## 2025-06-20 PROCEDURE — 2500000004 HC RX 250 GENERAL PHARMACY W/ HCPCS (ALT 636 FOR OP/ED)

## 2025-06-20 PROCEDURE — 73220 MRI UPPR EXTREMITY W/O&W/DYE: CPT | Mod: RIGHT SIDE | Performed by: STUDENT IN AN ORGANIZED HEALTH CARE EDUCATION/TRAINING PROGRAM

## 2025-06-20 PROCEDURE — 96375 TX/PRO/DX INJ NEW DRUG ADDON: CPT

## 2025-06-20 RX ORDER — METRONIDAZOLE 500 MG/100ML
500 INJECTION, SOLUTION INTRAVENOUS EVERY 8 HOURS
Status: DISCONTINUED | OUTPATIENT
Start: 2025-06-20 | End: 2025-06-21

## 2025-06-20 RX ORDER — TALC
3 POWDER (GRAM) TOPICAL NIGHTLY PRN
Status: DISCONTINUED | OUTPATIENT
Start: 2025-06-20 | End: 2025-06-24 | Stop reason: HOSPADM

## 2025-06-20 RX ORDER — HYDROCODONE BITARTRATE AND ACETAMINOPHEN 5; 325 MG/1; MG/1
1 TABLET ORAL EVERY 6 HOURS PRN
Refills: 0 | Status: DISCONTINUED | OUTPATIENT
Start: 2025-06-20 | End: 2025-06-22

## 2025-06-20 RX ORDER — ROSUVASTATIN CALCIUM 5 MG/1
5 TABLET, COATED ORAL DAILY
COMMUNITY

## 2025-06-20 RX ORDER — CLONAZEPAM 0.5 MG/1
0.5 TABLET ORAL 2 TIMES DAILY PRN
Status: DISCONTINUED | OUTPATIENT
Start: 2025-06-20 | End: 2025-06-21

## 2025-06-20 RX ORDER — ACETAMINOPHEN 160 MG/5ML
650 SOLUTION ORAL EVERY 4 HOURS PRN
Status: DISCONTINUED | OUTPATIENT
Start: 2025-06-20 | End: 2025-06-22

## 2025-06-20 RX ORDER — ROSUVASTATIN CALCIUM 10 MG/1
5 TABLET, COATED ORAL DAILY
Status: DISCONTINUED | OUTPATIENT
Start: 2025-06-20 | End: 2025-06-24 | Stop reason: HOSPADM

## 2025-06-20 RX ORDER — GADOTERATE MEGLUMINE 376.9 MG/ML
12 INJECTION INTRAVENOUS
Status: COMPLETED | OUTPATIENT
Start: 2025-06-20 | End: 2025-06-20

## 2025-06-20 RX ORDER — DULOXETIN HYDROCHLORIDE 30 MG/1
30 CAPSULE, DELAYED RELEASE ORAL DAILY
Status: DISCONTINUED | OUTPATIENT
Start: 2025-06-20 | End: 2025-06-20

## 2025-06-20 RX ORDER — DULOXETIN HYDROCHLORIDE 30 MG/1
60 CAPSULE, DELAYED RELEASE ORAL DAILY
Status: DISCONTINUED | OUTPATIENT
Start: 2025-06-20 | End: 2025-06-24 | Stop reason: HOSPADM

## 2025-06-20 RX ORDER — ACETAMINOPHEN 650 MG/1
650 SUPPOSITORY RECTAL EVERY 4 HOURS PRN
Status: DISCONTINUED | OUTPATIENT
Start: 2025-06-20 | End: 2025-06-22

## 2025-06-20 RX ORDER — METRONIDAZOLE 500 MG/1
500 TABLET ORAL ONCE
Status: DISCONTINUED | OUTPATIENT
Start: 2025-06-20 | End: 2025-06-20

## 2025-06-20 RX ORDER — KETOROLAC TROMETHAMINE 30 MG/ML
15 INJECTION, SOLUTION INTRAMUSCULAR; INTRAVENOUS ONCE
Status: COMPLETED | OUTPATIENT
Start: 2025-06-20 | End: 2025-06-20

## 2025-06-20 RX ORDER — BUPROPION HYDROCHLORIDE 150 MG/1
150 TABLET ORAL DAILY
Status: DISCONTINUED | OUTPATIENT
Start: 2025-06-20 | End: 2025-06-24 | Stop reason: HOSPADM

## 2025-06-20 RX ORDER — CLONAZEPAM 0.5 MG/1
2 TABLET ORAL 2 TIMES DAILY PRN
Status: DISCONTINUED | OUTPATIENT
Start: 2025-06-20 | End: 2025-06-20

## 2025-06-20 RX ORDER — HYDROXYZINE HYDROCHLORIDE 25 MG/1
25 TABLET, FILM COATED ORAL NIGHTLY
Status: DISCONTINUED | OUTPATIENT
Start: 2025-06-20 | End: 2025-06-24 | Stop reason: HOSPADM

## 2025-06-20 RX ORDER — METRONIDAZOLE 500 MG/100ML
500 INJECTION, SOLUTION INTRAVENOUS ONCE
Status: COMPLETED | OUTPATIENT
Start: 2025-06-20 | End: 2025-06-20

## 2025-06-20 RX ORDER — ACETAMINOPHEN 325 MG/1
650 TABLET ORAL EVERY 6 HOURS PRN
Status: DISCONTINUED | OUTPATIENT
Start: 2025-06-20 | End: 2025-06-22

## 2025-06-20 RX ORDER — LEVOTHYROXINE SODIUM 25 UG/1
25 TABLET ORAL DAILY
COMMUNITY

## 2025-06-20 RX ORDER — LEVOTHYROXINE SODIUM 25 UG/1
25 TABLET ORAL DAILY
Status: DISCONTINUED | OUTPATIENT
Start: 2025-06-20 | End: 2025-06-24 | Stop reason: HOSPADM

## 2025-06-20 RX ORDER — ONDANSETRON 4 MG/1
4 TABLET, ORALLY DISINTEGRATING ORAL EVERY 12 HOURS PRN
Status: DISCONTINUED | OUTPATIENT
Start: 2025-06-20 | End: 2025-06-22

## 2025-06-20 RX ORDER — ENOXAPARIN SODIUM 100 MG/ML
40 INJECTION SUBCUTANEOUS EVERY 24 HOURS
Status: DISCONTINUED | OUTPATIENT
Start: 2025-06-20 | End: 2025-06-22

## 2025-06-20 RX ORDER — ONDANSETRON HYDROCHLORIDE 2 MG/ML
4 INJECTION, SOLUTION INTRAVENOUS ONCE
Status: COMPLETED | OUTPATIENT
Start: 2025-06-20 | End: 2025-06-20

## 2025-06-20 RX ADMIN — CEFEPIME 1 G: 1 INJECTION, POWDER, FOR SOLUTION INTRAMUSCULAR; INTRAVENOUS at 13:54

## 2025-06-20 RX ADMIN — ONDANSETRON 4 MG: 2 INJECTION INTRAMUSCULAR; INTRAVENOUS at 07:23

## 2025-06-20 RX ADMIN — METRONIDAZOLE 500 MG: 500 INJECTION, SOLUTION INTRAVENOUS at 11:21

## 2025-06-20 RX ADMIN — HYDROXYZINE HYDROCHLORIDE 25 MG: 25 TABLET, FILM COATED ORAL at 20:52

## 2025-06-20 RX ADMIN — KETOROLAC TROMETHAMINE 15 MG: 30 INJECTION, SOLUTION INTRAMUSCULAR at 07:25

## 2025-06-20 RX ADMIN — CLONAZEPAM 0.25 MG: 0.5 TABLET ORAL at 20:52

## 2025-06-20 RX ADMIN — ENOXAPARIN SODIUM 40 MG: 100 INJECTION SUBCUTANEOUS at 13:54

## 2025-06-20 RX ADMIN — ACETAMINOPHEN 650 MG: 325 TABLET ORAL at 20:52

## 2025-06-20 RX ADMIN — HYDROCODONE BITARTRATE AND ACETAMINOPHEN 1 TABLET: 5; 325 TABLET ORAL at 15:48

## 2025-06-20 RX ADMIN — METRONIDAZOLE 500 MG: 500 INJECTION, SOLUTION INTRAVENOUS at 20:41

## 2025-06-20 RX ADMIN — GADOTERATE MEGLUMINE 12 ML: 376.9 INJECTION INTRAVENOUS at 13:42

## 2025-06-20 RX ADMIN — CEFEPIME 1 G: 1 INJECTION, POWDER, FOR SOLUTION INTRAMUSCULAR; INTRAVENOUS at 20:40

## 2025-06-20 SDOH — ECONOMIC STABILITY: HOUSING INSECURITY: AT ANY TIME IN THE PAST 12 MONTHS, WERE YOU HOMELESS OR LIVING IN A SHELTER (INCLUDING NOW)?: NO

## 2025-06-20 SDOH — SOCIAL STABILITY: SOCIAL INSECURITY: WITHIN THE LAST YEAR, HAVE YOU BEEN HUMILIATED OR EMOTIONALLY ABUSED IN OTHER WAYS BY YOUR PARTNER OR EX-PARTNER?: NO

## 2025-06-20 SDOH — ECONOMIC STABILITY: FOOD INSECURITY: WITHIN THE PAST 12 MONTHS, YOU WORRIED THAT YOUR FOOD WOULD RUN OUT BEFORE YOU GOT THE MONEY TO BUY MORE.: NEVER TRUE

## 2025-06-20 SDOH — SOCIAL STABILITY: SOCIAL INSECURITY: DO YOU FEEL ANYONE HAS EXPLOITED OR TAKEN ADVANTAGE OF YOU FINANCIALLY OR OF YOUR PERSONAL PROPERTY?: NO

## 2025-06-20 SDOH — SOCIAL STABILITY: SOCIAL INSECURITY: DO YOU FEEL UNSAFE GOING BACK TO THE PLACE WHERE YOU ARE LIVING?: NO

## 2025-06-20 SDOH — SOCIAL STABILITY: SOCIAL INSECURITY
WITHIN THE LAST YEAR, HAVE YOU BEEN KICKED, HIT, SLAPPED, OR OTHERWISE PHYSICALLY HURT BY YOUR PARTNER OR EX-PARTNER?: NO

## 2025-06-20 SDOH — ECONOMIC STABILITY: INCOME INSECURITY: IN THE PAST 12 MONTHS HAS THE ELECTRIC, GAS, OIL, OR WATER COMPANY THREATENED TO SHUT OFF SERVICES IN YOUR HOME?: NO

## 2025-06-20 SDOH — SOCIAL STABILITY: SOCIAL INSECURITY: ARE YOU OR HAVE YOU BEEN THREATENED OR ABUSED PHYSICALLY, EMOTIONALLY, OR SEXUALLY BY ANYONE?: NO

## 2025-06-20 SDOH — ECONOMIC STABILITY: HOUSING INSECURITY: IN THE PAST 12 MONTHS, HOW MANY TIMES HAVE YOU MOVED WHERE YOU WERE LIVING?: 1

## 2025-06-20 SDOH — ECONOMIC STABILITY: FOOD INSECURITY: HOW HARD IS IT FOR YOU TO PAY FOR THE VERY BASICS LIKE FOOD, HOUSING, MEDICAL CARE, AND HEATING?: NOT VERY HARD

## 2025-06-20 SDOH — ECONOMIC STABILITY: FOOD INSECURITY: WITHIN THE PAST 12 MONTHS, THE FOOD YOU BOUGHT JUST DIDN'T LAST AND YOU DIDN'T HAVE MONEY TO GET MORE.: NEVER TRUE

## 2025-06-20 SDOH — SOCIAL STABILITY: SOCIAL INSECURITY: WITHIN THE LAST YEAR, HAVE YOU BEEN AFRAID OF YOUR PARTNER OR EX-PARTNER?: NO

## 2025-06-20 SDOH — ECONOMIC STABILITY: HOUSING INSECURITY: IN THE LAST 12 MONTHS, WAS THERE A TIME WHEN YOU WERE NOT ABLE TO PAY THE MORTGAGE OR RENT ON TIME?: NO

## 2025-06-20 SDOH — SOCIAL STABILITY: SOCIAL INSECURITY: WERE YOU ABLE TO COMPLETE ALL THE BEHAVIORAL HEALTH SCREENINGS?: YES

## 2025-06-20 SDOH — SOCIAL STABILITY: SOCIAL INSECURITY
WITHIN THE LAST YEAR, HAVE YOU BEEN RAPED OR FORCED TO HAVE ANY KIND OF SEXUAL ACTIVITY BY YOUR PARTNER OR EX-PARTNER?: NO

## 2025-06-20 SDOH — SOCIAL STABILITY: SOCIAL INSECURITY: ARE THERE ANY APPARENT SIGNS OF INJURIES/BEHAVIORS THAT COULD BE RELATED TO ABUSE/NEGLECT?: NO

## 2025-06-20 SDOH — ECONOMIC STABILITY: TRANSPORTATION INSECURITY: IN THE PAST 12 MONTHS, HAS LACK OF TRANSPORTATION KEPT YOU FROM MEDICAL APPOINTMENTS OR FROM GETTING MEDICATIONS?: NO

## 2025-06-20 SDOH — SOCIAL STABILITY: SOCIAL INSECURITY: ABUSE: ADULT

## 2025-06-20 SDOH — SOCIAL STABILITY: SOCIAL INSECURITY: DOES ANYONE TRY TO KEEP YOU FROM HAVING/CONTACTING OTHER FRIENDS OR DOING THINGS OUTSIDE YOUR HOME?: NO

## 2025-06-20 SDOH — SOCIAL STABILITY: SOCIAL INSECURITY: HAS ANYONE EVER THREATENED TO HURT YOUR FAMILY OR YOUR PETS?: NO

## 2025-06-20 SDOH — SOCIAL STABILITY: SOCIAL INSECURITY: HAVE YOU HAD ANY THOUGHTS OF HARMING ANYONE ELSE?: NO

## 2025-06-20 SDOH — SOCIAL STABILITY: SOCIAL INSECURITY: HAVE YOU HAD THOUGHTS OF HARMING ANYONE ELSE?: NO

## 2025-06-20 ASSESSMENT — LIFESTYLE VARIABLES
TOTAL SCORE: 0
AUDIT-C TOTAL SCORE: 0
HOW OFTEN DO YOU HAVE 6 OR MORE DRINKS ON ONE OCCASION: NEVER
PRESCIPTION_ABUSE_PAST_12_MONTHS: NO
EVER FELT BAD OR GUILTY ABOUT YOUR DRINKING: NO
EVER HAD A DRINK FIRST THING IN THE MORNING TO STEADY YOUR NERVES TO GET RID OF A HANGOVER: NO
SUBSTANCE_ABUSE_PAST_12_MONTHS: NO
SKIP TO QUESTIONS 9-10: 1
HOW MANY STANDARD DRINKS CONTAINING ALCOHOL DO YOU HAVE ON A TYPICAL DAY: PATIENT DOES NOT DRINK
HAVE YOU EVER FELT YOU SHOULD CUT DOWN ON YOUR DRINKING: NO
HAVE PEOPLE ANNOYED YOU BY CRITICIZING YOUR DRINKING: NO
AUDIT-C TOTAL SCORE: 0
HOW OFTEN DO YOU HAVE A DRINK CONTAINING ALCOHOL: NEVER

## 2025-06-20 ASSESSMENT — ACTIVITIES OF DAILY LIVING (ADL)
GROOMING: INDEPENDENT
LACK_OF_TRANSPORTATION: NO
TOILETING: INDEPENDENT
ADEQUATE_TO_COMPLETE_ADL: YES
DRESSING YOURSELF: INDEPENDENT
WALKS IN HOME: INDEPENDENT
BATHING: INDEPENDENT
PATIENT'S MEMORY ADEQUATE TO SAFELY COMPLETE DAILY ACTIVITIES?: YES
LACK_OF_TRANSPORTATION: NO
FEEDING YOURSELF: INDEPENDENT
HEARING - RIGHT EAR: FUNCTIONAL
HEARING - LEFT EAR: FUNCTIONAL
JUDGMENT_ADEQUATE_SAFELY_COMPLETE_DAILY_ACTIVITIES: YES

## 2025-06-20 ASSESSMENT — PAIN - FUNCTIONAL ASSESSMENT
PAIN_FUNCTIONAL_ASSESSMENT: 0-10

## 2025-06-20 ASSESSMENT — COGNITIVE AND FUNCTIONAL STATUS - GENERAL
DAILY ACTIVITIY SCORE: 24
PATIENT BASELINE BEDBOUND: NO
MOBILITY SCORE: 24

## 2025-06-20 ASSESSMENT — PAIN SCALES - GENERAL
PAINLEVEL_OUTOF10: 3
PAINLEVEL_OUTOF10: 7
PAINLEVEL_OUTOF10: 8
PAINLEVEL_OUTOF10: 3

## 2025-06-20 ASSESSMENT — PAIN DESCRIPTION - LOCATION: LOCATION: HAND

## 2025-06-20 ASSESSMENT — PATIENT HEALTH QUESTIONNAIRE - PHQ9
2. FEELING DOWN, DEPRESSED OR HOPELESS: NOT AT ALL
SUM OF ALL RESPONSES TO PHQ9 QUESTIONS 1 & 2: 0
1. LITTLE INTEREST OR PLEASURE IN DOING THINGS: NOT AT ALL

## 2025-06-20 ASSESSMENT — PAIN DESCRIPTION - PAIN TYPE: TYPE: ACUTE PAIN

## 2025-06-20 ASSESSMENT — PAIN DESCRIPTION - ORIENTATION: ORIENTATION: RIGHT

## 2025-06-20 NOTE — H&P
History Of Present Illness  Coretta Bird is a 65 y.o. female presenting with pmhx anxiety/depression,  hypothyroidism,  Lumbar DJD who presents to the ER for right hand pain.  Patient describes getting bit by a cat yesterday on her right middle finger.    She was seen in an urgent care and started on doxycycline due to PCN allergy.   She was instructed to return if it worsened at all.   Swelling has worsened and there is now swelling into the hand.   Tolerating PO intake,  no major fevers/chills at this time.     Vitals stable.  Labs unremarkable overall.   Hand surgery contacted and recommended IV Abx and MRI of hand.  Referred to hospitalist service for admission. .              Past Medical History  She has a past medical history of Personal history of other diseases of the musculoskeletal system and connective tissue, Personal history of other diseases of the musculoskeletal system and connective tissue, and Personal history of other mental and behavioral disorders.        Surgical History  She has a past surgical history that includes Appendectomy (11/08/2016) and Varicose vein surgery (11/08/2016).       Social History  She reports that she has quit smoking. Her smoking use included cigarettes. She has never used smokeless tobacco. She reports that she does not currently use alcohol. She reports that she does not use drugs.      Family History  Family History[1]       Allergies  Doxycycline, Erythromycin, Penicillins, and Sulfamethoxazole      Review of Systems   Constitutional:  Negative for chills, fatigue and fever.   HENT:  Negative for congestion, ear pain, facial swelling, hearing loss and trouble swallowing.    Eyes:  Negative for photophobia, pain, redness and visual disturbance.   Respiratory:  Negative for cough, chest tightness, shortness of breath and wheezing.    Cardiovascular:  Negative for chest pain, palpitations and leg swelling.   Gastrointestinal:  Negative for abdominal distention,  "abdominal pain and nausea.   Endocrine: Negative for cold intolerance, heat intolerance, polydipsia and polyuria.   Genitourinary:  Negative for difficulty urinating, frequency and hematuria.   Skin:  Negative for color change, rash and wound.   Neurological:  Negative for dizziness, light-headedness, numbness and headaches.   Psychiatric/Behavioral:  Negative for agitation, confusion and suicidal ideas.        Physical Exam  GENERAL:   no distress, alert and cooperative  HEENT: Normal Inspection, Mucous membranes moist, No JVD, No Lymphadenopathy  CARDIOVASCULAR: RRR, no murmurs, 2+ equal pulses of the extremities, normal S1 and S 2  RESPIRATORY: Patent airways, CTAB, thorax symmetric, No significant wheezing, Rales or Rhonchi  ABDOMEN: Soft, Non-Tender, Normal Bowel Sounds, No Distention  SKIN: Warm and dry, no lesions, no rashes  EXTREMITIES: normal extremities, no significant cyanosis edema, contusions or wounds, no obsvious clubbing,  Right hand middle finder swelling and erythema noted into hand.  Delayed cap refill but present.  Pulses present  NEURO: A&O x 3, CN II-XII grossly intact  PSYCH: Appropriate mood and behavior    Additional Physical Exam Notes/Findings      Last Recorded Vitals  /80   Pulse 79   Temp 36.8 °C (98.2 °F) (Temporal)   Resp 16   Ht 1.778 m (5' 10\")   Wt 61.2 kg (135 lb)   SpO2 100%   BMI 19.37 kg/m²     Intake/Output last 3 Shifts:  No intake/output data recorded.      =========    RELEVANT RESULTS      ==========  Labs  Lab Results   Component Value Date    WBC 8.8 06/20/2025    HGB 12.1 06/20/2025    HCT 36.6 06/20/2025    MCV 94 06/20/2025     06/20/2025     Lab Results   Component Value Date    GLUCOSE 100 (H) 06/20/2025    CALCIUM 9.1 06/20/2025     06/20/2025    K 4.1 06/20/2025    CO2 29 06/20/2025     06/20/2025    BUN 11 06/20/2025    CREATININE 0.80 06/20/2025      Lab Results   Component Value Date    ALT 12 06/20/2025    AST 19 06/20/2025    " ALKPHOS 66 06/20/2025    BILITOT 1.6 (H) 06/20/2025          =======      SCHEDULED MEDICATIONS      =======    Scheduled Medications[2]      ==========      PRN MEDICATIONS      =========  clonazePAM, 0.5 mg, BID PRN  melatonin, 3 mg, Nightly PRN  ondansetron ODT, 4 mg, q12h PRN        ==============      DIET     ==============  Dietary Orders (From admission, onward)       Start     Ordered    06/20/25 1209  Adult diet Regular  Diet effective now        Question:  Diet type  Answer:  Regular    06/20/25 1208    06/20/25 1127  May Participate in Room Service  ( ROOM SERVICE MAY PARTICIPATE)  Once        Question:  .  Answer:  Yes    06/20/25 1126                    ======    Assessment/Plan     =======    ASSESSMENT:  Assessment & Plan  Cat bite of middle finger, initial encounter    ___________________________________________________    Right Hand/Finger cellulitis due to Cat Bite  H/o hypothyrodism  Anxiety/Depression    PLAN:    Cont. IV Abx  Ortho Hand consulted  MRI pending.   Consider ID consult if failure to respond.     DVT Prophylaxis  Subcutaneous lovenox             Ankush Mcallister DO         [1] No family history on file.  [2]   Scheduled medications   Medication Dose Route Frequency    buPROPion XL  150 mg oral Daily    cefepime  1 g intravenous q8h    DULoxetine  60 mg oral Daily    enoxaparin  40 mg subcutaneous q24h    hydrOXYzine HCL  25 mg oral Nightly    levothyroxine  25 mcg oral Daily    metroNIDAZOLE  500 mg intravenous Once    metroNIDAZOLE  500 mg intravenous q8h    rosuvastatin  5 mg oral Daily

## 2025-06-20 NOTE — ED PROVIDER NOTES
HPI   Chief Complaint   Patient presents with    Animal Bite       65-year-old female presenting to the emergency department from home due to a cat bite.  Patient reports she was bitten by a stray cat yesterday on the middle finger of the right hand.  States she was initially seen yesterday at urgent care and prescribed doxycycline as she has a known penicillin allergy.  Her tetanus is up-to-date.  Endorses increased swelling, redness, and warmth to the area.  States she has a constant dull ache and the finger is kept in flexion as extension exacerbates pain.  Reports numbness and tingling in the digit.  She also is experiencing chills and nausea secondary to doxycycline use but denies fevers or vomiting.  Although it is a stray cat, she does not express concerns of rabies and would not like to pursue vaccination at this time.              Patient History   Medical History[1]  Surgical History[2]  Family History[3]  Social History[4]    Physical Exam   ED Triage Vitals [06/20/25 0509]   Temperature Heart Rate Respirations BP   36.8 °C (98.2 °F) 89 18 116/70      Pulse Ox Temp Source Heart Rate Source Patient Position   100 % Temporal Monitor Sitting      BP Location FiO2 (%)     Left arm --       Physical Exam  Vitals and nursing note reviewed.   Constitutional:       General: She is not in acute distress.     Appearance: She is not ill-appearing or toxic-appearing.   HENT:      Head: Atraumatic.      Nose: Nose normal.      Mouth/Throat:      Mouth: Mucous membranes are moist.   Eyes:      Extraocular Movements: Extraocular movements intact.      Conjunctiva/sclera: Conjunctivae normal.   Cardiovascular:      Rate and Rhythm: Normal rate and regular rhythm.   Pulmonary:      Effort: Pulmonary effort is normal.      Breath sounds: Normal breath sounds.   Abdominal:      General: Abdomen is flat.      Palpations: Abdomen is soft.      Tenderness: There is no abdominal tenderness.   Musculoskeletal:      Cervical  back: Neck supple.      Comments: Multiple puncture wounds overlying the PIP joint of the middle finger of the right hand.  There is circumferential swelling, erythema, and warmth to the digit extending into the base of the digit but does not cover the palmar aspect of the hand.  Slight purulent drainage from the wounds is noted.  The digit is held in slight flexion, increased pain with extension.  Sensation intact, radial and ulnar pulses 2+.   Skin:     General: Skin is warm and dry.      Capillary Refill: Capillary refill takes less than 2 seconds.   Neurological:      Mental Status: She is alert and oriented to person, place, and time.   Psychiatric:         Mood and Affect: Mood normal.           ED Course & MDM   Diagnoses as of 06/20/25 1115   Cat bite of middle finger, initial encounter   Cellulitis of finger of right hand                 No data recorded     [place] Coma Scale Score: 15 (06/20/25 0513 : Nohelia Burciaga RN)                           Medical Decision Making  65-year-old female presenting to the emergency department from home due to a cat bite. Patient states she has taken 2 doses of her doxycycline as prescribed. Vital signs reviewed and stable.  Patient is overall well-appearing and not in any acute distress.  She is not ill or toxic in appearance.  The right hand is well-perfused and remains neurovascularly intact, however there is concerns for overlying cellulitis and potentially an abscess due to predominant swelling in the proximal aspect of the middle finger of the right hand.  Three kanavel signs positive but there is low suspicion of tenosynovitis at this time.  50 mg IV Toradol and 4 mg IV Zofran administered.  Basic labs were obtained and unremarkable.  No leukocytosis.  Normal CRP and sed rate.  Consulted orthopedic surgery and spoke to Dr. Cardenas who recommended overnight admission for observation, IV antibiotics, and an MRI.  Patient informed of lab findings and recommendations,  she was agreeable to plan of care and admission.  Discussed admission with hospitalist, Dr. Mcallister, who was agreeable to taking over care upon admission order.        Procedure  Procedures         [1]   Past Medical History:  Diagnosis Date    Personal history of other diseases of the musculoskeletal system and connective tissue     History of arthritis    Personal history of other diseases of the musculoskeletal system and connective tissue     History of backache    Personal history of other mental and behavioral disorders     History of depression   [2]   Past Surgical History:  Procedure Laterality Date    APPENDECTOMY  11/08/2016    Appendectomy    VARICOSE VEIN SURGERY  11/08/2016    Varicose Vein Ligation   [3] No family history on file.  [4]   Social History  Tobacco Use    Smoking status: Former     Types: Cigarettes    Smokeless tobacco: Never   Vaping Use    Vaping status: Never Used   Substance Use Topics    Alcohol use: Not Currently    Drug use: Never        Isabel Whiting PA-C  06/20/25 7260

## 2025-06-20 NOTE — ED TRIAGE NOTES
Patient presents to ED with complaints of cat bite to right hand middle finger knuckle. Patient states she was seen at  and given Doxycycline. Today patient noticed increased swelling to knuckle and spreading to hand. Patient states difficultly bending finger and notices numbness to finger tip. Patient states she did not receive Rabies vaccine at .

## 2025-06-21 LAB
ANION GAP SERPL CALC-SCNC: 12 MMOL/L (ref 10–20)
BUN SERPL-MCNC: 9 MG/DL (ref 6–23)
CALCIUM SERPL-MCNC: 8.7 MG/DL (ref 8.6–10.3)
CHLORIDE SERPL-SCNC: 105 MMOL/L (ref 98–107)
CO2 SERPL-SCNC: 27 MMOL/L (ref 21–32)
CREAT SERPL-MCNC: 0.76 MG/DL (ref 0.5–1.05)
EGFRCR SERPLBLD CKD-EPI 2021: 86 ML/MIN/1.73M*2
ERYTHROCYTE [DISTWIDTH] IN BLOOD BY AUTOMATED COUNT: 13.1 % (ref 11.5–14.5)
GLUCOSE SERPL-MCNC: 95 MG/DL (ref 74–99)
HCT VFR BLD AUTO: 34.9 % (ref 36–46)
HGB BLD-MCNC: 11.4 G/DL (ref 12–16)
MCH RBC QN AUTO: 31.5 PG (ref 26–34)
MCHC RBC AUTO-ENTMCNC: 32.7 G/DL (ref 32–36)
MCV RBC AUTO: 96 FL (ref 80–100)
NRBC BLD-RTO: 0 /100 WBCS (ref 0–0)
PLATELET # BLD AUTO: 218 X10*3/UL (ref 150–450)
POTASSIUM SERPL-SCNC: 4.1 MMOL/L (ref 3.5–5.3)
RBC # BLD AUTO: 3.62 X10*6/UL (ref 4–5.2)
SODIUM SERPL-SCNC: 140 MMOL/L (ref 136–145)
WBC # BLD AUTO: 5.3 X10*3/UL (ref 4.4–11.3)

## 2025-06-21 PROCEDURE — 36415 COLL VENOUS BLD VENIPUNCTURE: CPT | Performed by: INTERNAL MEDICINE

## 2025-06-21 PROCEDURE — 2500000001 HC RX 250 WO HCPCS SELF ADMINISTERED DRUGS (ALT 637 FOR MEDICARE OP): Performed by: INTERNAL MEDICINE

## 2025-06-21 PROCEDURE — 2500000004 HC RX 250 GENERAL PHARMACY W/ HCPCS (ALT 636 FOR OP/ED): Performed by: INTERNAL MEDICINE

## 2025-06-21 PROCEDURE — 99233 SBSQ HOSP IP/OBS HIGH 50: CPT | Performed by: INTERNAL MEDICINE

## 2025-06-21 PROCEDURE — 1100000001 HC PRIVATE ROOM DAILY

## 2025-06-21 PROCEDURE — 99221 1ST HOSP IP/OBS SF/LOW 40: CPT | Performed by: STUDENT IN AN ORGANIZED HEALTH CARE EDUCATION/TRAINING PROGRAM

## 2025-06-21 PROCEDURE — 2500000001 HC RX 250 WO HCPCS SELF ADMINISTERED DRUGS (ALT 637 FOR MEDICARE OP): Performed by: STUDENT IN AN ORGANIZED HEALTH CARE EDUCATION/TRAINING PROGRAM

## 2025-06-21 PROCEDURE — 80048 BASIC METABOLIC PNL TOTAL CA: CPT | Performed by: INTERNAL MEDICINE

## 2025-06-21 PROCEDURE — 85027 COMPLETE CBC AUTOMATED: CPT | Performed by: INTERNAL MEDICINE

## 2025-06-21 PROCEDURE — 2500000002 HC RX 250 W HCPCS SELF ADMINISTERED DRUGS (ALT 637 FOR MEDICARE OP, ALT 636 FOR OP/ED): Performed by: INTERNAL MEDICINE

## 2025-06-21 RX ORDER — VANCOMYCIN HYDROCHLORIDE 1.5 G/300ML
1500 INJECTION, SOLUTION INTRAVITREAL EVERY 24 HOURS
Status: DISCONTINUED | OUTPATIENT
Start: 2025-06-21 | End: 2025-06-21

## 2025-06-21 RX ORDER — CLONAZEPAM 0.5 MG/1
0.25 TABLET ORAL 2 TIMES DAILY PRN
Status: DISCONTINUED | OUTPATIENT
Start: 2025-06-21 | End: 2025-06-24 | Stop reason: HOSPADM

## 2025-06-21 RX ORDER — VANCOMYCIN HYDROCHLORIDE 1 G/20ML
INJECTION, POWDER, LYOPHILIZED, FOR SOLUTION INTRAVENOUS DAILY PRN
Status: DISCONTINUED | OUTPATIENT
Start: 2025-06-21 | End: 2025-06-21

## 2025-06-21 RX ORDER — MEROPENEM 1 G/1
1 INJECTION, POWDER, FOR SOLUTION INTRAVENOUS EVERY 8 HOURS
Status: DISCONTINUED | OUTPATIENT
Start: 2025-06-21 | End: 2025-06-24 | Stop reason: HOSPADM

## 2025-06-21 RX ADMIN — CLONAZEPAM 0.25 MG: 0.5 TABLET ORAL at 22:59

## 2025-06-21 RX ADMIN — HYDROCODONE BITARTRATE AND ACETAMINOPHEN 1 TABLET: 5; 325 TABLET ORAL at 19:37

## 2025-06-21 RX ADMIN — DULOXETINE 60 MG: 30 CAPSULE, DELAYED RELEASE ORAL at 08:26

## 2025-06-21 RX ADMIN — CEFEPIME 1 G: 1 INJECTION, POWDER, FOR SOLUTION INTRAMUSCULAR; INTRAVENOUS at 04:20

## 2025-06-21 RX ADMIN — HYDROXYZINE HYDROCHLORIDE 25 MG: 25 TABLET, FILM COATED ORAL at 21:11

## 2025-06-21 RX ADMIN — SODIUM CHLORIDE 1 G: 9 INJECTION, SOLUTION INTRAVENOUS at 21:11

## 2025-06-21 RX ADMIN — ENOXAPARIN SODIUM 40 MG: 100 INJECTION SUBCUTANEOUS at 14:15

## 2025-06-21 RX ADMIN — METRONIDAZOLE 500 MG: 500 INJECTION, SOLUTION INTRAVENOUS at 04:20

## 2025-06-21 RX ADMIN — SODIUM CHLORIDE 1 G: 9 INJECTION, SOLUTION INTRAVENOUS at 14:15

## 2025-06-21 RX ADMIN — ROSUVASTATIN CALCIUM 5 MG: 10 TABLET, FILM COATED ORAL at 08:26

## 2025-06-21 RX ADMIN — BUPROPION HYDROCHLORIDE 150 MG: 150 TABLET, EXTENDED RELEASE ORAL at 08:25

## 2025-06-21 RX ADMIN — LEVOTHYROXINE SODIUM 25 MCG: 0.03 TABLET ORAL at 05:11

## 2025-06-21 RX ADMIN — HYDROCODONE BITARTRATE AND ACETAMINOPHEN 1 TABLET: 5; 325 TABLET ORAL at 05:11

## 2025-06-21 ASSESSMENT — PAIN SCALES - WONG BAKER
WONGBAKER_NUMERICALRESPONSE: HURTS LITTLE MORE
WONGBAKER_NUMERICALRESPONSE: HURTS LITTLE MORE

## 2025-06-21 ASSESSMENT — PAIN - FUNCTIONAL ASSESSMENT
PAIN_FUNCTIONAL_ASSESSMENT: 0-10
PAIN_FUNCTIONAL_ASSESSMENT: CPOT (CRITICAL CARE PAIN OBSERVATION TOOL)

## 2025-06-21 ASSESSMENT — PAIN DESCRIPTION - DESCRIPTORS: DESCRIPTORS: ACHING

## 2025-06-21 ASSESSMENT — COGNITIVE AND FUNCTIONAL STATUS - GENERAL
DAILY ACTIVITIY SCORE: 24
MOBILITY SCORE: 24

## 2025-06-21 ASSESSMENT — PAIN SCALES - GENERAL
PAINLEVEL_OUTOF10: 6
PAINLEVEL_OUTOF10: 8
PAINLEVEL_OUTOF10: 2

## 2025-06-21 ASSESSMENT — PAIN DESCRIPTION - LOCATION
LOCATION: HAND
LOCATION: FINGER (COMMENT WHICH ONE)

## 2025-06-21 ASSESSMENT — PAIN DESCRIPTION - ORIENTATION
ORIENTATION: RIGHT;MID
ORIENTATION: RIGHT

## 2025-06-21 NOTE — CONSULTS
Vancomycin Dosing by Pharmacy- INITIAL    Coretta Bird is a 65 y.o. year old female who Pharmacy has been consulted for vancomycin dosing for cellulitis, skin and soft tissue. Based on the patient's indication and renal status this patient will be dosed based on a goal AUC of 400-600.     Renal function is currently stable.    Visit Vitals  /73   Pulse 90   Temp 36.3 °C (97.3 °F)   Resp 16        Lab Results   Component Value Date    CREATININE 0.76 2025    CREATININE 0.80 2025    CREATININE 0.86 2024    CREATININE 0.85 2023        Patient weight is as follows:   Vitals:    25 0509   Weight: 61.2 kg (135 lb)       Cultures:  No results found for the encounter in last 14 days.        I/O last 3 completed shifts:  In: 150 (2.4 mL/kg) [IV Piggyback:150]  Out: - (0 mL/kg)   Weight: 61.2 kg   I/O during current shift:  No intake/output data recorded.    Temp (24hrs), Av.7 °C (98 °F), Min:36.3 °C (97.3 °F), Max:37 °C (98.6 °F)         Assessment/Plan     Patient will not be given a loading dose.  Will initiate vancomycin maintenance, 1500 mg every 24 hours.    This dosing regimen is predicted by [employer] to result in the following pharmacokinetic parameters:    Regimen: 1500 mg IV every 24 hours.  Start time: 09:01 on 2025  Exposure target: AUC24 (range) 400-600 mg/L.hr   [id number]: 402 mg/L.hr  AUC24,ss: 490 mg/L.hr  Probability of AUC24 > 400: 73 %  Ctrough,ss: 12.9 mg/L  Probability of Ctrough,ss > 20: 15 %      Follow-up level will be ordered on  at AM Labs unless clinically indicated sooner.  Will continue to monitor renal function daily while on vancomycin and order serum creatinine at least every 48 hours if not already ordered.  Follow for continued vancomycin needs, clinical response, and signs/symptoms of toxicity.       Nadiya Jacobson, PharmD

## 2025-06-21 NOTE — CARE PLAN
She has decided to decline the rabies immunoglobulin and vaccine.      We discussed that there is no treatment for rabies otherwise and that she would die of rabies if she has contracted it.  She is aware, understands, and accepts these risks.

## 2025-06-21 NOTE — CARE PLAN
The patient's goals for the shift include      The clinical goals for the shift include rest    Over the shift, the patient did make progress toward the following goals. Barriers to progression include . Recommendations to address these barriers include .    Problem: Pain - Adult  Goal: Verbalizes/displays adequate comfort level or baseline comfort level  Outcome: Progressing     Problem: Safety - Adult  Goal: Free from fall injury  Outcome: Progressing     Problem: Discharge Planning  Goal: Discharge to home or other facility with appropriate resources  Outcome: Progressing     Problem: Chronic Conditions and Co-morbidities  Goal: Patient's chronic conditions and co-morbidity symptoms are monitored and maintained or improved  Outcome: Progressing     Problem: Nutrition  Goal: Nutrient intake appropriate for maintaining nutritional needs  Outcome: Progressing

## 2025-06-21 NOTE — PROGRESS NOTES
"                                                                 Hospital Medicine Progress Note       Patient Name: Coretta Bird   YOB: 1960    Subjective:    Coretta Bird is a 65 y.o.yo female who is hospital day 1     No new events. Is concerned her hand isn't looking better. No fevers or chills.      Objective:    Recent labs, imaging, vital signs and notes from other providers were reviewed     /73   Pulse 90   Temp 36.3 °C (97.3 °F)   Resp 16   Ht 1.778 m (5' 10\")   Wt 61.2 kg (135 lb)   SpO2 97%   BMI 19.37 kg/m²     Physical Exam:    GENERAL: well developed, non-toxic, NAD, alert & cooperative  HEENT: normocephalic, atraumatic, sclera clear  CARDIAC: regular rate & rhythm, S1/S2  PULMONARY: CTA b/l, no respiratory distress, - wheezes  ABDOMEN: soft, non-tender, non-distended  MSK: R hand is swollen and erythematous on dorsal aspect, her 3rd digit is swollen with limited ROM  NEURO: A&O X 3, non-focal, CN II-XII grossly intact  SKIN: Warm and dry, no lesions, no rashes.  PSYCH: appropriate mood & affect     Assessment/Plan:    Cat Bite  Cellulitis/Tenosynovitis   Hypothyroidism  HLD  Anxiety/Depression   OA      Cont IV abx with Cefepime, Flagyl and add Vanc. Ortho Hand was consulted, await eval and recs. Consult ID. Will make patient NPO until seen by ortho.   Cont home meds including synthroid, wellbutrin, cymbalta, atarax       DVT Prophylaxis: lovenox  Code Status: FULL CODE  Disposition: home      Enrique Schneider, DO  Hospital Medicine    "

## 2025-06-21 NOTE — PROGRESS NOTES
Vancomycin Dosing by Pharmacy- Cessation of Therapy    Consult to pharmacy for vancomycin dosing has been discontinued by the prescriber, pharmacy will sign off at this time.    Please call pharmacy if there are further questions or re-enter a consult if vancomycin is resumed.     Nadiya Jacobson, CarmelitaD    Anxiety    Atheroscler native arteries the extremities w/intermit claudication    Cigarette smoker    Depression    GERD (gastroesophageal reflux disease)    HLD (hyperlipidemia)    Hypertension    Insomnia    Intermittent claudication    Left leg swelling  lower leg  Pain in left shoulder    Poor circulation of extremity  Blockage of artery in LLE  Tear of left rotator cuff

## 2025-06-21 NOTE — CONSULTS
Consults  Reason for Consult:  Evaluation management of right third finger cellulitis with tenosynovitis    Patient is seen at the request of Dr. Enrique Schneider    Subjective   History of Present Illness:  Coretta Bird is a 65 y.o. female who presented on 6/20/2025 with swelling and pain in her right third finger.  She says that on 6/19/2025 she was walking outside and decided to pet a stray cat.  She reached her hand out and the cat bit her in her third finger.  She went to urgent care and was evaluated.  Her tetanus shot was deemed to be up-to-date.  She was placed on oral doxycycline which she started taking.  This despite taking the antibiotic her third finger continue to swell and become increasingly painful.  She can no longer move her finger so she came into the emergency department on 6/20/2025.  On arrival she had a temperature of 36.8 with a white blood count of 8.8 and a creatinine of 0.8.  She went for an MRI which showed tenosynovitis.  She was placed on cefepime, Flagyl, and vancomycin.  She says that the redness and swelling is only slightly better today.    Past Medical History:   has a past medical history of Personal history of other diseases of the musculoskeletal system and connective tissue, Personal history of other diseases of the musculoskeletal system and connective tissue, and Personal history of other mental and behavioral disorders.    has a past surgical history that includes Appendectomy (11/08/2016) and Varicose vein surgery (11/08/2016).     Social History:   reports that she has quit smoking. Her smoking use included cigarettes. She has never used smokeless tobacco. She reports that she does not currently use alcohol. She reports that she does not use drugs.     She lives alone with 2 pet cats.  She is retired.  She knows nothing more about the cat that bit her.   there is no way to track down the cat or observe the cat over the next 10 days    Family History:  No sick  contacts    Review of Systems:  She denies fevers nausea vomiting or diarrhea    Allergies:  Doxycycline, Erythromycin, Penicillins, and Sulfamethoxazole      Objective   Current Medications[1]    Physical Exam:  /73 (BP Location: Right arm, Patient Position: Sitting)   Pulse 90   Temp 36.3 °C (97.3 °F) (Temporal)   Resp 16   Wt 61.2 kg (135 lb)   SpO2 97%    General: no acute distress, lying in bed  HEENT: pink pharynx  CVS: RRR  Resp: decreased breath sounds in bases  ABD: soft, NT, ND  EXT: Right third finger with edema and erythema; the puncture sites are dried over and scabbed; there is no drainage; she has minimal flexion of her finger and her finger is bent in a slightly flexed position compared to her other fingers  Skin: no rash     Relevant Results:    Labs:  Results from last 72 hours   Lab Units 06/21/25  0611 06/20/25  0718   SODIUM mmol/L 140 136   POTASSIUM mmol/L 4.1 4.1   CHLORIDE mmol/L 105 102   BUN mg/dL 9 11   CREATININE mg/dL 0.76 0.80     Results from last 72 hours   Lab Units 06/21/25  0611 06/20/25  0718   WBC AUTO x10*3/uL 5.3 8.8   HEMOGLOBIN g/dL 11.4* 12.1   HEMATOCRIT % 34.9* 36.6   PLATELETS AUTO x10*3/uL 218 253       Microbiology data: I have personally and independently reviewed and intrepreted the lab results  No new finding    Imaging data: I have personally and independently reviewed and interpreted the imaging studies  6/20/2025 MRI of the right hand shows:  1.  No evidence is signal abnormality about the 5th proximal  interphalangeal joint to suggest infectious process around that joint.  2. Subcutaneous soft tissue edema and enhancement is nonspecific but  may reflect cellulitis in the appropriate clinical scenario.  3. Nonspecific 3rd and 4th flexor compartment peritendinous  edema/mild tenosynovitis. These findings are nonspecific but may be  infectious or inflammatory in nature. Consider correlation with  inflammatory markers and bilateral hand radiographs,  particularly in  light of questionable 3rd metacarpal head erosion.  4. 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       Assessment/Plan     MY IMPRESSION & RECOMMENDATIONS:  Cat bite with right third finger cellulitis complicated by tenosynovitis and right hand cellulitis.  I would recommend that we consolidate her antibiotics to meropenem.  She has been seen by orthopedic surgery and surgery is being considered for as early as tomorrow.  She unfortunately was bitten by a stray cat so we are not able to observe the cat for any period of time to make an assessment regarding rabies.  In the circumstances, I would recommend that she receive rabies immunoglobulin and rabies postexposure prophylaxis.  Although the chance of rabies in [place] is extremely low, it is not zero and there is no treatment otherwise if she were to contract it.  I have reviewed the risks and benefits of the rabies postexposure prophylaxis with her.    - Can consolidate antibiotics to meropenem IV  - Will give rabies immunoglobulin today as well as the rabies vaccine today  - Will need to coordinate the administration of the rabies vaccine on day 3, day 7, and day 14    Other issues:  #Hyperlipidemia  #Osteoarthritis  #Hypothyroidism    ~I have personally and independently reviewed and interpreted the laboratory tests, imaging studies, and the documentation from other healthcare providers.  I will monitor for side effects from meropenem, which can include rash, diarrhea, and bone marrow suppression.  I will also monitor for side effects from rabies immunoglobulin and rabies vaccine, which can include pain at the injection site, headache, and very rarely Guillain-Barré syndrome.         Marlo Cruz MD         [1]   Current Facility-Administered Medications   Medication Dose Route Frequency Provider Last Rate Last Admin    acetaminophen (Tylenol) tablet 650 mg  650 mg oral q6h PRN Ankush Mcallister,  DO   650 mg at 06/20/25 2052    Or    acetaminophen (Tylenol) oral liquid 650 mg  650 mg nasogastric tube q4h PRN Ankush MORAN Crivelli, DO        Or    acetaminophen (Tylenol) suppository 650 mg  650 mg rectal q4h PRN Ankush MORAN Crivelli, DO        buPROPion XL (Wellbutrin XL) 24 hr tablet 150 mg  150 mg oral Daily Ankush MORAN Crivelli, DO   150 mg at 06/21/25 0825    clonazePAM (KlonoPIN) tablet 0.5 mg  0.5 mg oral BID PRN Ankush MORAN Crivelli, DO   0.25 mg at 06/20/25 2052    DULoxetine (Cymbalta) DR capsule 60 mg  60 mg oral Daily Ankush Mcallister, DO   60 mg at 06/21/25 0826    enoxaparin (Lovenox) syringe 40 mg  40 mg subcutaneous q24h Ankush Mcallister, DO   40 mg at 06/20/25 1354    HYDROcodone-acetaminophen (Norco) 5-325 mg per tablet 1 tablet  1 tablet oral q6h PRN Ankush MORAN Crivelkassie, DO   1 tablet at 06/21/25 0511    hydrOXYzine HCL (Atarax) tablet 25 mg  25 mg oral Nightly Ankush MORAN Crivelli, DO   25 mg at 06/20/25 2052    levothyroxine (Synthroid, Levoxyl) tablet 25 mcg  25 mcg oral Daily Ankush Morganvelli, DO   25 mcg at 06/21/25 0511    melatonin tablet 3 mg  3 mg oral Nightly PRN Ankush Prideli, DO        meropenem (Merrem) 1 g in sodium chloride 0.9%  mL  1 g intravenous q8h Marlo Cruz MD        ondansetron ODT (Zofran-ODT) disintegrating tablet 4 mg  4 mg oral q12h PRN Ankush Mcallister, DO        rabies immune globulin (HypeRAB, Kedrab) injection 1,230 Units  20 Units/kg infiltration Once Marlo Cruz MD        rabies vaccine (from purified chicken embryo cells) (RabAvert) 2.5 unit immunization 1 mL  1 mL intramuscular Once Marlo Cruz MD        [START ON 6/24/2025] rabies vaccine (from purified chicken embryo cells) (RabAvert) 2.5 unit immunization 1 mL  1 mL intramuscular Once Marlo Cruz MD        rosuvastatin (Crestor) tablet 5 mg  5 mg oral Daily Ankush Mcallister DO   5 mg at 06/21/25 8034

## 2025-06-21 NOTE — CONSULTS
Reason For Consult  Right hand pain    History Of Present Illness  Coretta Bird is a 65 y.o. female presenting with right hand swelling/pain.  The patient reports that on Thursday she was bit by a cat.  She was initially placed on oral antibiotics but felt her pain was worsening so she presented to the hospital where she was placed on IV antibiotics beginning yesterday.  She reports no significant changes.  She continues to have swelling some pain to the digit.  No previous history of surgery or infections of the hand.     Past Medical History  She has a past medical history of Personal history of other diseases of the musculoskeletal system and connective tissue, Personal history of other diseases of the musculoskeletal system and connective tissue, and Personal history of other mental and behavioral disorders.    Surgical History  She has a past surgical history that includes Appendectomy (11/08/2016) and Varicose vein surgery (11/08/2016).     Social History  She reports that she has quit smoking. Her smoking use included cigarettes. She has never used smokeless tobacco. She reports that she does not currently use alcohol. She reports that she does not use drugs.    Family History  Family History[1]     Allergies  Doxycycline, Erythromycin, Penicillins, and Sulfamethoxazole    Review of Systems  As per HPI.     Physical Exam  Well-appearing female in no acute distress.  The third digits of the right hand has some mild erythema with diffuse swelling.  There is no tenderness palpation along the flexor aspect of the tendons.  No pain with passive extension of the digit.  She is firing the AIN, PIN, median, radial, ulnar nerve distributions.  Sensate to light touch in the median, radial, ulnar nerve distributions.  2+ radial pulse.    MRI from 6/20/2025 with nonspecific subcutaneous edema and enhancement without evidence of fracture.     Last Recorded Vitals  Blood pressure 128/73, pulse 90, temperature 36.3 °C  "(97.3 °F), temperature source Temporal, resp. rate 16, height 1.778 m (5' 10\"), weight 61.2 kg (135 lb), SpO2 97%.    Relevant Results      Scheduled medications  Scheduled Medications[2]  Continuous medications  Continuous Medications[3]  PRN medications  PRN Medications[4]  Results for orders placed or performed during the hospital encounter of 06/20/25 (from the past 24 hours)   CBC   Result Value Ref Range    WBC 5.3 4.4 - 11.3 x10*3/uL    nRBC 0.0 0.0 - 0.0 /100 WBCs    RBC 3.62 (L) 4.00 - 5.20 x10*6/uL    Hemoglobin 11.4 (L) 12.0 - 16.0 g/dL    Hematocrit 34.9 (L) 36.0 - 46.0 %    MCV 96 80 - 100 fL    MCH 31.5 26.0 - 34.0 pg    MCHC 32.7 32.0 - 36.0 g/dL    RDW 13.1 11.5 - 14.5 %    Platelets 218 150 - 450 x10*3/uL   Basic metabolic panel   Result Value Ref Range    Glucose 95 74 - 99 mg/dL    Sodium 140 136 - 145 mmol/L    Potassium 4.1 3.5 - 5.3 mmol/L    Chloride 105 98 - 107 mmol/L    Bicarbonate 27 21 - 32 mmol/L    Anion Gap 12 10 - 20 mmol/L    Urea Nitrogen 9 6 - 23 mg/dL    Creatinine 0.76 0.50 - 1.05 mg/dL    eGFR 86 >60 mL/min/1.73m*2    Calcium 8.7 8.6 - 10.3 mg/dL        Assessment/Plan     This is a 65-year-old female with right hand third digit cellulitis status post cat bite injury.    - Continue to monitor clinically with IV antibiotics  - Recommend continuing to trend ESR/CRP  - Recommend ID consultation for management/recommendations of antibiotics  - N.p.o. at midnight for pending evaluation in the morning  - Rest and elevation with icing to reduce swelling  - Pain control as needed.    Kyle Yang MD MPH         [1] No family history on file.  [2] buPROPion XL, 150 mg, oral, Daily  cefepime, 1 g, intravenous, q8h  DULoxetine, 60 mg, oral, Daily  enoxaparin, 40 mg, subcutaneous, q24h  hydrOXYzine HCL, 25 mg, oral, Nightly  levothyroxine, 25 mcg, oral, Daily  metroNIDAZOLE, 500 mg, intravenous, q8h  rosuvastatin, 5 mg, oral, Daily  vancomycin, 1,500 mg, intravenous, q24h    [3]    [4] " PRN medications: acetaminophen **OR** acetaminophen **OR** acetaminophen, clonazePAM, HYDROcodone-acetaminophen, melatonin, ondansetron ODT, vancomycin

## 2025-06-22 ENCOUNTER — ANESTHESIA EVENT (OUTPATIENT)
Dept: OPERATING ROOM | Facility: HOSPITAL | Age: 65
DRG: 581 | End: 2025-06-22
Payer: MEDICARE

## 2025-06-22 ENCOUNTER — ANESTHESIA (OUTPATIENT)
Dept: OPERATING ROOM | Facility: HOSPITAL | Age: 65
DRG: 581 | End: 2025-06-22
Payer: MEDICARE

## 2025-06-22 VITALS
HEART RATE: 82 BPM | RESPIRATION RATE: 20 BRPM | HEIGHT: 70 IN | SYSTOLIC BLOOD PRESSURE: 120 MMHG | OXYGEN SATURATION: 96 % | BODY MASS INDEX: 19.33 KG/M2 | TEMPERATURE: 98.1 F | DIASTOLIC BLOOD PRESSURE: 69 MMHG | WEIGHT: 135 LBS

## 2025-06-22 PROBLEM — L03.011 CELLULITIS OF FINGER OF RIGHT HAND: Status: ACTIVE | Noted: 2025-06-20

## 2025-06-22 LAB
ANION GAP SERPL CALC-SCNC: 10 MMOL/L (ref 10–20)
BUN SERPL-MCNC: 6 MG/DL (ref 6–23)
CALCIUM SERPL-MCNC: 8.7 MG/DL (ref 8.6–10.3)
CHLORIDE SERPL-SCNC: 104 MMOL/L (ref 98–107)
CO2 SERPL-SCNC: 30 MMOL/L (ref 21–32)
CREAT SERPL-MCNC: 0.77 MG/DL (ref 0.5–1.05)
CRP SERPL-MCNC: 1.27 MG/DL
EGFRCR SERPLBLD CKD-EPI 2021: 85 ML/MIN/1.73M*2
ERYTHROCYTE [DISTWIDTH] IN BLOOD BY AUTOMATED COUNT: 12.8 % (ref 11.5–14.5)
GLUCOSE SERPL-MCNC: 91 MG/DL (ref 74–99)
GRAM STN SPEC: NORMAL
HCT VFR BLD AUTO: 34.1 % (ref 36–46)
HGB BLD-MCNC: 11.1 G/DL (ref 12–16)
MCH RBC QN AUTO: 31 PG (ref 26–34)
MCHC RBC AUTO-ENTMCNC: 32.6 G/DL (ref 32–36)
MCV RBC AUTO: 95 FL (ref 80–100)
NRBC BLD-RTO: 0 /100 WBCS (ref 0–0)
PLATELET # BLD AUTO: 232 X10*3/UL (ref 150–450)
POTASSIUM SERPL-SCNC: 3.7 MMOL/L (ref 3.5–5.3)
RBC # BLD AUTO: 3.58 X10*6/UL (ref 4–5.2)
SODIUM SERPL-SCNC: 140 MMOL/L (ref 136–145)
WBC # BLD AUTO: 5.5 X10*3/UL (ref 4.4–11.3)

## 2025-06-22 PROCEDURE — 2500000005 HC RX 250 GENERAL PHARMACY W/O HCPCS: Performed by: ORTHOPAEDIC SURGERY

## 2025-06-22 PROCEDURE — A26145 PR RAD EXCIS JT LINING,FLEXOR,EACH: Performed by: ANESTHESIOLOGIST ASSISTANT

## 2025-06-22 PROCEDURE — 2500000002 HC RX 250 W HCPCS SELF ADMINISTERED DRUGS (ALT 637 FOR MEDICARE OP, ALT 636 FOR OP/ED): Performed by: INTERNAL MEDICINE

## 2025-06-22 PROCEDURE — 1100000001 HC PRIVATE ROOM DAILY

## 2025-06-22 PROCEDURE — 87070 CULTURE OTHR SPECIMN AEROBIC: CPT | Mod: PARLAB | Performed by: ORTHOPAEDIC SURGERY

## 2025-06-22 PROCEDURE — 87075 CULTR BACTERIA EXCEPT BLOOD: CPT | Mod: PARLAB | Performed by: ORTHOPAEDIC SURGERY

## 2025-06-22 PROCEDURE — 0RCW0ZZ EXTIRPATION OF MATTER FROM RIGHT FINGER PHALANGEAL JOINT, OPEN APPROACH: ICD-10-PCS | Performed by: ORTHOPAEDIC SURGERY

## 2025-06-22 PROCEDURE — 7100000002 HC RECOVERY ROOM TIME - EACH INCREMENTAL 1 MINUTE: Performed by: ORTHOPAEDIC SURGERY

## 2025-06-22 PROCEDURE — 80048 BASIC METABOLIC PNL TOTAL CA: CPT | Performed by: INTERNAL MEDICINE

## 2025-06-22 PROCEDURE — 20525 RMVL FB MUSC/TDN DEEP/COMP: CPT | Performed by: ORTHOPAEDIC SURGERY

## 2025-06-22 PROCEDURE — 3600000002 HC OR TIME - INITIAL BASE CHARGE - PROCEDURE LEVEL TWO: Performed by: ORTHOPAEDIC SURGERY

## 2025-06-22 PROCEDURE — A26145 PR RAD EXCIS JT LINING,FLEXOR,EACH: Performed by: ANESTHESIOLOGY

## 2025-06-22 PROCEDURE — 2500000004 HC RX 250 GENERAL PHARMACY W/ HCPCS (ALT 636 FOR OP/ED): Performed by: ORTHOPAEDIC SURGERY

## 2025-06-22 PROCEDURE — 2500000001 HC RX 250 WO HCPCS SELF ADMINISTERED DRUGS (ALT 637 FOR MEDICARE OP): Performed by: ORTHOPAEDIC SURGERY

## 2025-06-22 PROCEDURE — 99233 SBSQ HOSP IP/OBS HIGH 50: CPT | Performed by: INTERNAL MEDICINE

## 2025-06-22 PROCEDURE — 86140 C-REACTIVE PROTEIN: CPT | Performed by: STUDENT IN AN ORGANIZED HEALTH CARE EDUCATION/TRAINING PROGRAM

## 2025-06-22 PROCEDURE — 99221 1ST HOSP IP/OBS SF/LOW 40: CPT | Performed by: ORTHOPAEDIC SURGERY

## 2025-06-22 PROCEDURE — 2720000007 HC OR 272 NO HCPCS: Performed by: ORTHOPAEDIC SURGERY

## 2025-06-22 PROCEDURE — 2500000001 HC RX 250 WO HCPCS SELF ADMINISTERED DRUGS (ALT 637 FOR MEDICARE OP): Performed by: INTERNAL MEDICINE

## 2025-06-22 PROCEDURE — 99140 ANES COMP EMERGENCY COND: CPT | Performed by: ANESTHESIOLOGY

## 2025-06-22 PROCEDURE — 3700000001 HC GENERAL ANESTHESIA TIME - INITIAL BASE CHARGE: Performed by: ORTHOPAEDIC SURGERY

## 2025-06-22 PROCEDURE — 2500000004 HC RX 250 GENERAL PHARMACY W/ HCPCS (ALT 636 FOR OP/ED): Performed by: INTERNAL MEDICINE

## 2025-06-22 PROCEDURE — 3600000007 HC OR TIME - EACH INCREMENTAL 1 MINUTE - PROCEDURE LEVEL TWO: Performed by: ORTHOPAEDIC SURGERY

## 2025-06-22 PROCEDURE — 85027 COMPLETE CBC AUTOMATED: CPT | Performed by: INTERNAL MEDICINE

## 2025-06-22 PROCEDURE — 7100000001 HC RECOVERY ROOM TIME - INITIAL BASE CHARGE: Performed by: ORTHOPAEDIC SURGERY

## 2025-06-22 PROCEDURE — 3700000002 HC GENERAL ANESTHESIA TIME - EACH INCREMENTAL 1 MINUTE: Performed by: ORTHOPAEDIC SURGERY

## 2025-06-22 PROCEDURE — 0LB70ZZ EXCISION OF RIGHT HAND TENDON, OPEN APPROACH: ICD-10-PCS | Performed by: ORTHOPAEDIC SURGERY

## 2025-06-22 PROCEDURE — 2500000004 HC RX 250 GENERAL PHARMACY W/ HCPCS (ALT 636 FOR OP/ED): Performed by: ANESTHESIOLOGIST ASSISTANT

## 2025-06-22 PROCEDURE — 26145 TENDON EXCISION PALM/FINGER: CPT | Performed by: ORTHOPAEDIC SURGERY

## 2025-06-22 PROCEDURE — 36415 COLL VENOUS BLD VENIPUNCTURE: CPT | Performed by: INTERNAL MEDICINE

## 2025-06-22 PROCEDURE — 26020 DRAIN HAND TENDON SHEATH: CPT | Performed by: ORTHOPAEDIC SURGERY

## 2025-06-22 PROCEDURE — 2500000004 HC RX 250 GENERAL PHARMACY W/ HCPCS (ALT 636 FOR OP/ED): Mod: JZ | Performed by: ANESTHESIOLOGY

## 2025-06-22 PROCEDURE — 0LN70ZZ RELEASE RIGHT HAND TENDON, OPEN APPROACH: ICD-10-PCS | Performed by: ORTHOPAEDIC SURGERY

## 2025-06-22 RX ORDER — OXYCODONE HYDROCHLORIDE 5 MG/1
5 TABLET ORAL EVERY 6 HOURS PRN
Status: DISCONTINUED | OUTPATIENT
Start: 2025-06-22 | End: 2025-06-24 | Stop reason: HOSPADM

## 2025-06-22 RX ORDER — SODIUM CHLORIDE, SODIUM LACTATE, POTASSIUM CHLORIDE, CALCIUM CHLORIDE 600; 310; 30; 20 MG/100ML; MG/100ML; MG/100ML; MG/100ML
100 INJECTION, SOLUTION INTRAVENOUS CONTINUOUS
Status: ACTIVE | OUTPATIENT
Start: 2025-06-22 | End: 2025-06-23

## 2025-06-22 RX ORDER — KETOROLAC TROMETHAMINE 30 MG/ML
INJECTION, SOLUTION INTRAMUSCULAR; INTRAVENOUS AS NEEDED
Status: DISCONTINUED | OUTPATIENT
Start: 2025-06-22 | End: 2025-06-22

## 2025-06-22 RX ORDER — OXYCODONE HYDROCHLORIDE 5 MG/1
10 TABLET ORAL EVERY 4 HOURS PRN
Status: DISCONTINUED | OUTPATIENT
Start: 2025-06-22 | End: 2025-06-24 | Stop reason: HOSPADM

## 2025-06-22 RX ORDER — SODIUM CHLORIDE 0.9 G/100ML
INJECTION, SOLUTION IRRIGATION AS NEEDED
Status: DISCONTINUED | OUTPATIENT
Start: 2025-06-22 | End: 2025-06-22 | Stop reason: HOSPADM

## 2025-06-22 RX ORDER — LABETALOL HYDROCHLORIDE 5 MG/ML
10 INJECTION, SOLUTION INTRAVENOUS ONCE AS NEEDED
Status: DISCONTINUED | OUTPATIENT
Start: 2025-06-22 | End: 2025-06-22 | Stop reason: HOSPADM

## 2025-06-22 RX ORDER — ONDANSETRON HYDROCHLORIDE 2 MG/ML
INJECTION, SOLUTION INTRAVENOUS AS NEEDED
Status: DISCONTINUED | OUTPATIENT
Start: 2025-06-22 | End: 2025-06-22

## 2025-06-22 RX ORDER — DIPHENHYDRAMINE HYDROCHLORIDE 50 MG/ML
12.5 INJECTION, SOLUTION INTRAMUSCULAR; INTRAVENOUS EVERY 6 HOURS PRN
Status: DISCONTINUED | OUTPATIENT
Start: 2025-06-22 | End: 2025-06-24 | Stop reason: HOSPADM

## 2025-06-22 RX ORDER — OXYCODONE HYDROCHLORIDE 5 MG/1
2.5 TABLET ORAL EVERY 4 HOURS PRN
Status: DISCONTINUED | OUTPATIENT
Start: 2025-06-22 | End: 2025-06-24 | Stop reason: HOSPADM

## 2025-06-22 RX ORDER — ACETAMINOPHEN 325 MG/1
650 TABLET ORAL EVERY 6 HOURS SCHEDULED
Status: DISCONTINUED | OUTPATIENT
Start: 2025-06-22 | End: 2025-06-24 | Stop reason: HOSPADM

## 2025-06-22 RX ORDER — ACETAMINOPHEN 325 MG/1
650 TABLET ORAL EVERY 4 HOURS PRN
Status: DISCONTINUED | OUTPATIENT
Start: 2025-06-22 | End: 2025-06-22 | Stop reason: HOSPADM

## 2025-06-22 RX ORDER — LIDOCAINE HCL/PF 100 MG/5ML
SYRINGE (ML) INTRAVENOUS AS NEEDED
Status: DISCONTINUED | OUTPATIENT
Start: 2025-06-22 | End: 2025-06-22

## 2025-06-22 RX ORDER — FENTANYL CITRATE 50 UG/ML
INJECTION, SOLUTION INTRAMUSCULAR; INTRAVENOUS AS NEEDED
Status: DISCONTINUED | OUTPATIENT
Start: 2025-06-22 | End: 2025-06-22

## 2025-06-22 RX ORDER — SENNOSIDES 8.6 MG/1
2 TABLET ORAL 2 TIMES DAILY
Status: DISCONTINUED | OUTPATIENT
Start: 2025-06-22 | End: 2025-06-24 | Stop reason: HOSPADM

## 2025-06-22 RX ORDER — MIDAZOLAM HYDROCHLORIDE 1 MG/ML
INJECTION, SOLUTION INTRAMUSCULAR; INTRAVENOUS AS NEEDED
Status: DISCONTINUED | OUTPATIENT
Start: 2025-06-22 | End: 2025-06-22

## 2025-06-22 RX ORDER — CYCLOBENZAPRINE HCL 10 MG
10 TABLET ORAL 3 TIMES DAILY PRN
Status: DISCONTINUED | OUTPATIENT
Start: 2025-06-22 | End: 2025-06-24 | Stop reason: HOSPADM

## 2025-06-22 RX ORDER — ONDANSETRON HYDROCHLORIDE 2 MG/ML
4 INJECTION, SOLUTION INTRAVENOUS EVERY 8 HOURS PRN
Status: DISCONTINUED | OUTPATIENT
Start: 2025-06-22 | End: 2025-06-24 | Stop reason: HOSPADM

## 2025-06-22 RX ORDER — DIPHENHYDRAMINE HCL 12.5MG/5ML
12.5 LIQUID (ML) ORAL EVERY 6 HOURS PRN
Status: DISCONTINUED | OUTPATIENT
Start: 2025-06-22 | End: 2025-06-24 | Stop reason: HOSPADM

## 2025-06-22 RX ORDER — PROMETHAZINE HYDROCHLORIDE 25 MG/1
25 SUPPOSITORY RECTAL EVERY 12 HOURS PRN
Status: DISCONTINUED | OUTPATIENT
Start: 2025-06-22 | End: 2025-06-24 | Stop reason: HOSPADM

## 2025-06-22 RX ORDER — ONDANSETRON 4 MG/1
4 TABLET, ORALLY DISINTEGRATING ORAL EVERY 8 HOURS PRN
Status: DISCONTINUED | OUTPATIENT
Start: 2025-06-22 | End: 2025-06-24 | Stop reason: HOSPADM

## 2025-06-22 RX ORDER — PROMETHAZINE HYDROCHLORIDE 25 MG/1
25 TABLET ORAL EVERY 6 HOURS PRN
Status: DISCONTINUED | OUTPATIENT
Start: 2025-06-22 | End: 2025-06-24 | Stop reason: HOSPADM

## 2025-06-22 RX ORDER — HYDROMORPHONE HYDROCHLORIDE 1 MG/ML
1 INJECTION, SOLUTION INTRAMUSCULAR; INTRAVENOUS; SUBCUTANEOUS EVERY 5 MIN PRN
Status: DISCONTINUED | OUTPATIENT
Start: 2025-06-22 | End: 2025-06-22 | Stop reason: HOSPADM

## 2025-06-22 RX ORDER — PHENYLEPHRINE HCL IN 0.9% NACL 1 MG/10 ML
SYRINGE (ML) INTRAVENOUS AS NEEDED
Status: DISCONTINUED | OUTPATIENT
Start: 2025-06-22 | End: 2025-06-22

## 2025-06-22 RX ORDER — ENOXAPARIN SODIUM 100 MG/ML
30 INJECTION SUBCUTANEOUS EVERY 12 HOURS
Status: DISCONTINUED | OUTPATIENT
Start: 2025-06-22 | End: 2025-06-24 | Stop reason: HOSPADM

## 2025-06-22 RX ORDER — SODIUM CHLORIDE, SODIUM LACTATE, POTASSIUM CHLORIDE, CALCIUM CHLORIDE 600; 310; 30; 20 MG/100ML; MG/100ML; MG/100ML; MG/100ML
100 INJECTION, SOLUTION INTRAVENOUS CONTINUOUS
Status: DISCONTINUED | OUTPATIENT
Start: 2025-06-22 | End: 2025-06-22 | Stop reason: HOSPADM

## 2025-06-22 RX ORDER — NALOXONE HYDROCHLORIDE 1 MG/ML
0.2 INJECTION INTRAMUSCULAR; INTRAVENOUS; SUBCUTANEOUS EVERY 5 MIN PRN
Status: DISCONTINUED | OUTPATIENT
Start: 2025-06-22 | End: 2025-06-24 | Stop reason: HOSPADM

## 2025-06-22 RX ORDER — HYDRALAZINE HYDROCHLORIDE 20 MG/ML
10 INJECTION INTRAMUSCULAR; INTRAVENOUS EVERY 30 MIN PRN
Status: DISCONTINUED | OUTPATIENT
Start: 2025-06-22 | End: 2025-06-22 | Stop reason: HOSPADM

## 2025-06-22 RX ORDER — PROPOFOL 10 MG/ML
INJECTION, EMULSION INTRAVENOUS AS NEEDED
Status: DISCONTINUED | OUTPATIENT
Start: 2025-06-22 | End: 2025-06-22

## 2025-06-22 RX ORDER — DIPHENHYDRAMINE HYDROCHLORIDE 50 MG/ML
12.5 INJECTION, SOLUTION INTRAMUSCULAR; INTRAVENOUS ONCE AS NEEDED
Status: DISCONTINUED | OUTPATIENT
Start: 2025-06-22 | End: 2025-06-22 | Stop reason: HOSPADM

## 2025-06-22 RX ORDER — MORPHINE SULFATE 2 MG/ML
2 INJECTION, SOLUTION INTRAMUSCULAR; INTRAVENOUS EVERY 2 HOUR PRN
Status: DISCONTINUED | OUTPATIENT
Start: 2025-06-22 | End: 2025-06-24 | Stop reason: HOSPADM

## 2025-06-22 RX ORDER — ONDANSETRON HYDROCHLORIDE 2 MG/ML
4 INJECTION, SOLUTION INTRAVENOUS ONCE AS NEEDED
Status: DISCONTINUED | OUTPATIENT
Start: 2025-06-22 | End: 2025-06-22 | Stop reason: HOSPADM

## 2025-06-22 RX ADMIN — SENNOSIDES 17.2 MG: 8.6 TABLET, FILM COATED ORAL at 21:55

## 2025-06-22 RX ADMIN — HYDROXYZINE HYDROCHLORIDE 25 MG: 25 TABLET, FILM COATED ORAL at 21:55

## 2025-06-22 RX ADMIN — MIDAZOLAM 2 MG: 1 INJECTION INTRAMUSCULAR; INTRAVENOUS at 12:20

## 2025-06-22 RX ADMIN — HYDROCODONE BITARTRATE AND ACETAMINOPHEN 1 TABLET: 5; 325 TABLET ORAL at 08:15

## 2025-06-22 RX ADMIN — KETOROLAC TROMETHAMINE 30 MG: 30 INJECTION, SOLUTION INTRAMUSCULAR; INTRAVENOUS at 12:29

## 2025-06-22 RX ADMIN — PROPOFOL 150 MG: 10 INJECTION, EMULSION INTRAVENOUS at 12:26

## 2025-06-22 RX ADMIN — LEVOTHYROXINE SODIUM 25 MCG: 0.03 TABLET ORAL at 05:13

## 2025-06-22 RX ADMIN — Medication 100 MCG: at 12:35

## 2025-06-22 RX ADMIN — SODIUM CHLORIDE 1 G: 9 INJECTION, SOLUTION INTRAVENOUS at 05:13

## 2025-06-22 RX ADMIN — FENTANYL CITRATE 25 MCG: 50 INJECTION, SOLUTION INTRAMUSCULAR; INTRAVENOUS at 13:10

## 2025-06-22 RX ADMIN — DULOXETINE 60 MG: 30 CAPSULE, DELAYED RELEASE ORAL at 08:15

## 2025-06-22 RX ADMIN — FENTANYL CITRATE 50 MCG: 50 INJECTION, SOLUTION INTRAMUSCULAR; INTRAVENOUS at 12:26

## 2025-06-22 RX ADMIN — ONDANSETRON 4 MG: 2 INJECTION, SOLUTION INTRAMUSCULAR; INTRAVENOUS at 12:33

## 2025-06-22 RX ADMIN — FENTANYL CITRATE 25 MCG: 50 INJECTION, SOLUTION INTRAMUSCULAR; INTRAVENOUS at 13:35

## 2025-06-22 RX ADMIN — ROSUVASTATIN CALCIUM 5 MG: 10 TABLET, FILM COATED ORAL at 08:15

## 2025-06-22 RX ADMIN — BUPROPION HYDROCHLORIDE 150 MG: 150 TABLET, EXTENDED RELEASE ORAL at 08:14

## 2025-06-22 RX ADMIN — DEXAMETHASONE SODIUM PHOSPHATE 4 MG: 4 INJECTION, SOLUTION INTRAMUSCULAR; INTRAVENOUS at 12:29

## 2025-06-22 RX ADMIN — SODIUM CHLORIDE, SODIUM LACTATE, POTASSIUM CHLORIDE, AND CALCIUM CHLORIDE: .6; .31; .03; .02 INJECTION, SOLUTION INTRAVENOUS at 12:20

## 2025-06-22 RX ADMIN — LIDOCAINE HYDROCHLORIDE 40 MG: 20 INJECTION, SOLUTION INTRAVENOUS at 12:26

## 2025-06-22 RX ADMIN — SODIUM CHLORIDE, SODIUM LACTATE, POTASSIUM CHLORIDE, AND CALCIUM CHLORIDE 100 ML/HR: .6; .31; .03; .02 INJECTION, SOLUTION INTRAVENOUS at 15:00

## 2025-06-22 RX ADMIN — HYDROMORPHONE HYDROCHLORIDE 1 MG: 1 INJECTION, SOLUTION INTRAMUSCULAR; INTRAVENOUS; SUBCUTANEOUS at 13:51

## 2025-06-22 RX ADMIN — SODIUM CHLORIDE 1 G: 9 INJECTION, SOLUTION INTRAVENOUS at 15:03

## 2025-06-22 RX ADMIN — SODIUM CHLORIDE 1 G: 9 INJECTION, SOLUTION INTRAVENOUS at 21:55

## 2025-06-22 RX ADMIN — ACETAMINOPHEN 650 MG: 325 TABLET ORAL at 19:35

## 2025-06-22 RX ADMIN — CLONAZEPAM 0.25 MG: 0.5 TABLET ORAL at 22:02

## 2025-06-22 SDOH — HEALTH STABILITY: MENTAL HEALTH: CURRENT SMOKER: 0

## 2025-06-22 ASSESSMENT — PAIN SCALES - GENERAL
PAINLEVEL_OUTOF10: 0 - NO PAIN
PAINLEVEL_OUTOF10: 3
PAINLEVEL_OUTOF10: 7
PAINLEVEL_OUTOF10: 3
PAIN_LEVEL: 0
PAINLEVEL_OUTOF10: 3
PAINLEVEL_OUTOF10: 8
PAINLEVEL_OUTOF10: 0 - NO PAIN

## 2025-06-22 ASSESSMENT — PAIN - FUNCTIONAL ASSESSMENT
PAIN_FUNCTIONAL_ASSESSMENT: 0-10

## 2025-06-22 ASSESSMENT — PAIN SCALES - WONG BAKER: WONGBAKER_NUMERICALRESPONSE: HURTS LITTLE BIT

## 2025-06-22 ASSESSMENT — PAIN DESCRIPTION - DESCRIPTORS
DESCRIPTORS: SHARP
DESCRIPTORS: ACHING
DESCRIPTORS: ACHING

## 2025-06-22 ASSESSMENT — PAIN DESCRIPTION - ORIENTATION
ORIENTATION: RIGHT
ORIENTATION: RIGHT

## 2025-06-22 NOTE — CARE PLAN
The patient's goals for the shift include      The clinical goals for the shift include rest, iv abx

## 2025-06-22 NOTE — CONSULTS
Reason For Consult  Cat bite with right middle finger pain and swelling    History Of Present Illness  Coretta Bird is a 65 y.o. female presenting with cat bite with middle finger pain and swelling.  Patient was placed on p.o. doxycycline however return to the hospital due to increased pain and swelling.  She was evaluated by my orthopedic colleagues and patient developed progressive pain and swelling despite IV antibiotic treatment.  I am evaluate the patient the preoperative holding area where she acknowledges ongoing pain and discomfort in the finger and extending down to the palm.  Denies any current numbness or tingling affected extremities.     Past Medical History  She has a past medical history of Personal history of other diseases of the musculoskeletal system and connective tissue, Personal history of other diseases of the musculoskeletal system and connective tissue, and Personal history of other mental and behavioral disorders.    Surgical History  She has a past surgical history that includes Appendectomy (11/08/2016) and Varicose vein surgery (11/08/2016).     Social History  She reports that she has quit smoking. Her smoking use included cigarettes. She has never used smokeless tobacco. She reports that she does not currently use alcohol. She reports that she does not use drugs.    Family History  Family History[1]     Allergies  Doxycycline, Erythromycin, Penicillins, and Sulfamethoxazole    Review of Systems  Denies current chest pain, shortness of breath, nausea, vomiting     Physical Exam  Right hand: Patient demonstrates substantial swelling in the flexed posturing of the middle finger.  Patient has 2 areas of puncture wounds along the ulnar aspect of the PIP joint as well as along the dorsal radial aspect of the PIP joint.  Patient has fairly circumferential swelling that extends along the entirety of the finger and extends into the portion of the palm along the MCP joint.  Patient is tender  "to palpation diffusely along the volar surface.  Patient has pain with extension of the finger.     Last Recorded Vitals  Blood pressure 137/77, pulse 77, temperature 36.4 °C (97.5 °F), temperature source Temporal, resp. rate 18, height 1.778 m (5' 10\"), weight 61.2 kg (135 lb), SpO2 100%.    Relevant Results  MRI of the right hand with and without contrast was independently reviewed    Agree with findings consistent with swelling of the middle finger with swelling of the tendon sheath without obvious fluid collection.    Scheduled medications  Scheduled Medications[2]  Continuous medications  Continuous Medications[3]  PRN medications  PRN Medications[4]  Results for orders placed or performed during the hospital encounter of 06/20/25 (from the past 24 hours)   CBC   Result Value Ref Range    WBC 5.5 4.4 - 11.3 x10*3/uL    nRBC 0.0 0.0 - 0.0 /100 WBCs    RBC 3.58 (L) 4.00 - 5.20 x10*6/uL    Hemoglobin 11.1 (L) 12.0 - 16.0 g/dL    Hematocrit 34.1 (L) 36.0 - 46.0 %    MCV 95 80 - 100 fL    MCH 31.0 26.0 - 34.0 pg    MCHC 32.6 32.0 - 36.0 g/dL    RDW 12.8 11.5 - 14.5 %    Platelets 232 150 - 450 x10*3/uL   Basic Metabolic Panel   Result Value Ref Range    Glucose 91 74 - 99 mg/dL    Sodium 140 136 - 145 mmol/L    Potassium 3.7 3.5 - 5.3 mmol/L    Chloride 104 98 - 107 mmol/L    Bicarbonate 30 21 - 32 mmol/L    Anion Gap 10 10 - 20 mmol/L    Urea Nitrogen 6 6 - 23 mg/dL    Creatinine 0.77 0.50 - 1.05 mg/dL    eGFR 85 >60 mL/min/1.73m*2    Calcium 8.7 8.6 - 10.3 mg/dL   C-reactive protein   Result Value Ref Range    C-Reactive Protein 1.27 (H) <1.00 mg/dL        Assessment/Plan     Flexor tenosynovitis right middle finger    Patient has developed flexor tenosynovitis of the right middle finger that has not resolved with IV antibiotic treatment.  Recommendation for my standpoint is for irrigation and debridement of the right middle finger with extension into the palm and likely release of the A1 pulley to address " infection due to cat bite with deep perforation into the deeper tissues.  Will likely open up the PIP joint as there is likely been contamination of the PIP joint due to location of puncture of the cat bite wound.  Risk of surgery discussed extents with patient clued not limited to damage nerves, tendons, vessels, persistent pain, stiffness, swelling, need for revision surgery, cardiopulmonary complications with procedure and anesthesia including not limited to DVT, PE, stroke, heart attack with goals of surgery to appropriately irrigate and debride all damaged tissue and maximize chance of functional recovery with main goal of reducing bacterial burden in the finger and allow for maximal penetration of IV antibiotics with culture directed antibiotic treatment.  With knowledge of risk benefits and alternatives patient is elected to proceed.  Patient will be kept NPO.    Cesar Cardenas MD         [1] No family history on file.  [2] [Transfer Hold] buPROPion XL, 150 mg, oral, Daily  [Transfer Hold] DULoxetine, 60 mg, oral, Daily  [Transfer Hold] enoxaparin, 40 mg, subcutaneous, q24h  [Transfer Hold] hydrOXYzine HCL, 25 mg, oral, Nightly  [Transfer Hold] levothyroxine, 25 mcg, oral, Daily  [Transfer Hold] meropenem, 1 g, intravenous, q8h  [Transfer Hold] rosuvastatin, 5 mg, oral, Daily    [3]    [4] PRN medications: [Transfer Hold] acetaminophen **OR** [Transfer Hold] acetaminophen **OR** [Transfer Hold] acetaminophen, [Transfer Hold] clonazePAM, [Transfer Hold] HYDROcodone-acetaminophen, [Transfer Hold] melatonin, [Transfer Hold] ondansetron ODT

## 2025-06-22 NOTE — ANESTHESIA POSTPROCEDURE EVALUATION
Patient: Coretta Bird    Procedure Summary       Date: 06/22/25 Room / Location: [facility] 04 / [facility]    Anesthesia Start: 1220 Anesthesia Stop: 1344    Procedure: DEBRIDEMENT, HAND (Right: Hand) Diagnosis:       Cat bite of middle finger, initial encounter      Cellulitis of finger of right hand      (Cat bite of middle finger, initial encounter [S61.258A, W55.01XA])      (Cellulitis of finger of right hand [L03.011])    Surgeons: Cesar Cardenas MD Responsible Provider: Tarun Salamanca MD    Anesthesia Type: general ASA Status: 2 - Emergent            Anesthesia Type: general    Vitals Value Taken Time   /69 06/22/25 13:43   Temp 36.5 °C (97.7 °F) 06/22/25 13:43   Pulse 77 06/22/25 13:43   Resp 18 06/22/25 13:43   SpO2 100 % 06/22/25 13:43       Anesthesia Post Evaluation    Patient location during evaluation: PACU  Patient participation: complete - patient participated  Level of consciousness: awake  Pain score: 0  Pain management: adequate  Airway patency: patent  Cardiovascular status: acceptable  Respiratory status: acceptable  Hydration status: acceptable  Postoperative Nausea and Vomiting: none        There were no known notable events for this encounter.

## 2025-06-22 NOTE — PROGRESS NOTES
For follow-up of:  Right third finger cellulitis    Subjective   Interval History:  She complains of some pain in her 2nd and 4th fingers today       Objective     Current Medications[1]     Last Recorded Vitals  /71 (BP Location: Right arm, Patient Position: Lying)   Pulse 83   Temp 36.4 °C (97.5 °F) (Temporal)   Resp 18   Wt 61.2 kg (135 lb)   SpO2 98%   General: no acute distress, lying in bed  HEENT: pharynx not examined  CVS: RRR  Resp: decreased breath sounds in bases  ABD: soft, NT, ND  EXT: Right hand with less erythema; there is still edema and erythema of her right third finger; there is slight edema of her 2nd and 4th finger; she has minimal flexion of her right third finger  Skin: no rash     Relevant Results:    Labs:   Results from last 72 hours   Lab Units 06/22/25  0611 06/21/25  0611 06/20/25  0718   SODIUM mmol/L 140 140 136   POTASSIUM mmol/L 3.7 4.1 4.1   CHLORIDE mmol/L 104 105 102   BUN mg/dL 6 9 11   CREATININE mg/dL 0.77 0.76 0.80     Results from last 72 hours   Lab Units 06/22/25  0611 06/21/25  0611 06/20/25  0718   WBC AUTO x10*3/uL 5.5 5.3 8.8   HEMOGLOBIN g/dL 11.1* 11.4* 12.1   HEMATOCRIT % 34.1* 34.9* 36.6   PLATELETS AUTO x10*3/uL 232 218 253       Microbiology data: I have personally and independently reviewed and intrepreted the lab results  No new findings    Imaging data: I have personally and independently reviewed and interpreted the imaging studies  6/20/2025 MRI of the right hand shows:  1.  No evidence is signal abnormality about the 5th proximal  interphalangeal joint to suggest infectious process around that joint.  2. Subcutaneous soft tissue edema and enhancement is nonspecific but  may reflect cellulitis in the appropriate clinical scenario.  3. Nonspecific 3rd and 4th flexor compartment peritendinous  edema/mild tenosynovitis. These findings are nonspecific but may be  infectious or inflammatory in nature. Consider correlation with  inflammatory markers and  bilateral hand radiographs, particularly in  light of questionable 3rd metacarpal head erosion.  4. 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       Assessment/Plan     MY IMPRESSION & RECOMMENDATIONS:  Cat bite with right third finger and hand cellulitis, which has shown some improvement.  She is still dealing with significant tenosynovitis of the third finger.  I would recommend that she remain on the meropenem.  Orthopedics is considering surgical intervention as early as today.      - Continue meropenem     Other issues:  #Hyperlipidemia  #Osteoarthritis  #Hypothyroidism     ~I have personally and independently reviewed and interpreted the laboratory tests, imaging studies, and the documentation from other healthcare providers.  I will monitor for side effects from meropenem as outlined in a previous note.  Her prognosis is uncertain due to the severity of this infection and the unknown chance of potential rabies from a stray cat.       Marlo Cruz MD          [1]   Current Facility-Administered Medications   Medication Dose Route Frequency Provider Last Rate Last Admin    acetaminophen (Tylenol) tablet 650 mg  650 mg oral q6h PRN Ankush Mcallister, DO   650 mg at 06/20/25 2052    Or    acetaminophen (Tylenol) oral liquid 650 mg  650 mg nasogastric tube q4h PRN Ankush Mcallister DO        Or    acetaminophen (Tylenol) suppository 650 mg  650 mg rectal q4h PRN Ankush Mcallister, DO        buPROPion XL (Wellbutrin XL) 24 hr tablet 150 mg  150 mg oral Daily Ankush Mcallister, DO   150 mg at 06/22/25 0814    clonazePAM (KlonoPIN) tablet 0.25 mg  0.25 mg oral BID PRN Kailey Sigala DO   0.25 mg at 06/21/25 2259    DULoxetine (Cymbalta) DR capsule 60 mg  60 mg oral Daily Ankush Mcallister, DO   60 mg at 06/22/25 0815    enoxaparin (Lovenox) syringe 40 mg  40 mg subcutaneous q24h Ankush Mcallister, DO   40 mg at 06/21/25 1415    HYDROcodone-acetaminophen (Norco) 5-325 mg  per tablet 1 tablet  1 tablet oral q6h PRN Ankush Mcallister DO   1 tablet at 06/22/25 0815    hydrOXYzine HCL (Atarax) tablet 25 mg  25 mg oral Nightly Ankush Mcallister, DO   25 mg at 06/21/25 2111    levothyroxine (Synthroid, Levoxyl) tablet 25 mcg  25 mcg oral Daily Ankush Mcallister, DO   25 mcg at 06/22/25 0513    melatonin tablet 3 mg  3 mg oral Nightly PRN Ankush Mcallister DO        meropenem (Merrem) 1 g in sodium chloride 0.9%  mL  1 g intravenous q8h Marlo Cruz MD   Stopped at 06/22/25 0657    ondansetron ODT (Zofran-ODT) disintegrating tablet 4 mg  4 mg oral q12h PRN Ankush Mcallister DO        rosuvastatin (Crestor) tablet 5 mg  5 mg oral Daily Ankush Mcallister DO   5 mg at 06/22/25 0815

## 2025-06-22 NOTE — OP NOTE
DEBRIDEMENT, HAND (R) Operative Note     Date: 2025  OR Location: [facility]    Name: Coretta Bird, : 1960, Age: 65 y.o., MRN: [id number], Sex: female    Diagnosis  Pre-op Diagnosis      * Cat bite of middle finger, initial encounter [S61.258A, W55.01XA]     * Cellulitis of finger of right hand [L03.011] Postop diagnosis:  Flexor tenosynovitis right middle finger, contaminated PIP joint right middle finger, retained foreign body right middle finger, deep space infection into the hand     Procedures  1.  Irrigation and debridement of right middle finger  2.  Tenosynovectomy flexor pollicis longus and flexor digitorum superficialis right middle finger  3.  Arthrotomy PIP joint right middle finger  4.  Removal foreign body Cat Tooth Right Middle Finger  5.  A1 pulley release right middle finger      Surgeons      * Cesar Cardenas - Primary    Resident/Fellow/Other Assistant:  Surgeons and Role:  * No surgeons found with a matching role *    Staff:   Circulator: Jeimy Scherer Person: Alonso  Surgical Assistant: Artemio    Anesthesia Staff: Anesthesiologist: Tarun Salamanca MD  C-AA: RONA Askew    Procedure Summary  Anesthesia: General  ASA: II  Estimated Blood Loss: 20 mL  Intra-op Medications:   Administrations occurring from 1145 to 1315 on 25:   Medication Name Total Dose   sodium chloride 0.9 % irrigation solution 3,600 mL   dexAMETHasone (Decadron) 4 mg/mL IV Syringe 2 mL 4 mg   fentaNYL (Sublimaze) injection 50 mcg/mL 75 mcg   ketorolac (Toradol) injection 30 mg 30 mg   LR bolus Cannot be calculated   lidocaine (cardiac) injection 2% prefilled syringe 40 mg   midazolam (Versed) injection 1 mg/mL 2 mg   ondansetron (Zofran) 2 mg/mL injection 4 mg   phenylephrine 100 mcg/mL syringe 10 mL (prefilled) 100 mcg   propofol (Diprivan) injection 10 mg/mL 150 mg              Anesthesia Record               Intraprocedure I/O Totals          Output    Est. Blood Loss 20 mL    Total Output 20 mL           Specimen:   ID Type Source Tests Collected by Time   A : TENDON SHEATH PALM CULTURE Swab ABSCESS TISSUE/WOUND CULTURE/SMEAR Cesar Cardenas MD 6/22/2025 1240   B : TENDON SHEATH FINGER CULTURE Swab ABSCESS TISSUE/WOUND CULTURE/SMEAR Cesar Cardenas MD 6/22/2025 1240                 Drains and/or Catheters: * None in log *    Tourniquet Times:     Total Tourniquet Time Documented:  Arm - Upper (Right) - 31 minutes  Arm - Upper (Right) - 21 minutes  Total: Arm - Upper (Right) - 52 minutes      Implants:     Findings: End-stage arthritic change of the left hip    Indications: Coretta Bird is an 65 y.o. female who is having surgery for complex infection involving the right middle finger after cat bite.  Patient has already failed p.o. and IV antibiotic therapy with worsening of symptoms and progression of pain to the palm.  Recommendation was made to proceed with operative invention in the form irrigation debridement of the right finger and palm.  Patient was aware of risk benefits and alternatives and did elect to proceed.    The patient was seen in the preoperative area. The risks, benefits, complications, treatment options, non-operative alternatives, expected recovery and outcomes were discussed with the patient. The possibilities of reaction to medication, pulmonary aspiration, injury to surrounding structures, bleeding, recurrent infection, the need for additional procedures, failure to diagnose a condition, and creating a complication requiring transfusion or operation were discussed with the patient. The patient concurred with the proposed plan, giving informed consent.  The site of surgery was properly noted/marked if necessary per policy. The patient has been actively warmed in preoperative area. Preoperative antibiotics are already being given as a result of her treatment for an infection. Venous thrombosis prophylaxis have been ordered including bilateral sequential compression devices and chemical  prophylaxis    Procedure Details:     Patient was taken the to the operating room and placed supine the operative table.  A tourniquet was applied to the right arm and the right hand and forearm were prepped and draped as is typical for right hand and extremity procedure.  A timeout was performed, all parties were identified, and the correct surgical site was noted.  At the conclusion the timeout, tourniquet was inflated to 250 mmHg.  There was no exsanguination of the limb that occurred.  A Samira incision was carried down through the long finger along its volar aspect where the predominance of swelling and the flexed posture of the digit had occurred.  Incorporation along the ulnar aspect of the PIP joint of 1 limb of the Samira incision to allow for exploration of the region given its location to the cat bite.  Multiple areas of purulence were identified along the flexor tendon sheath.  There is specific flexor tendon sheath purulence identified at the level to cat bite at the PIP joint.  This had caused some damage to the proximal aspect of the A3 pulley with the remainder of the A3 pulley remaining intact.  Proximally at the A1 pulley there was substantial purulence identified.  As such in order to adequately decompress this and to visualize the extent of the infection an A1 pulley release was subsequently performed.  This resulted in further expression of purulent fluid.  Cultures were sent both from the finger as well as from the pulm for further analysis.  I then proceeded to milk the hand and further evidence of purulence with identified in the deep space and as such the Samira incision was extended proximally identifying extensive tenosynovitis of the flexor tendons of both the flexor digitorum profundus and flexor digitorum superficialis of the long finger.  There is no evidence of purulence extending along the remainder of the tendon sheath proximally.  Using a combination of Littler scissors as well as  15 blade scalpel the tenosynovitis and excess synovium was carefully excised and extracted leaving healthy tendon tissue behind.  Fortunately outside of the synovium the tendon was overall healthy and intact.  There was no evidence of rupture.  I evaluated for any evidence of a loose body or incarcerated body along the PIP joint and could not identify any such foreign body.  I proceeded to debride infected synovium along the entirety of the flexor tendon sheath leaving behind only healthy tendon and A2 A4 and A3 pulleys were left intact.    I then proceeded to copiously irrigate this region with 3 L of normal saline.    I then turned my attention to the radial dorsal aspect of the PIP joint where a puncture wound had occurred.  Using a mid axial incision and extending this slightly proximally and distally I was able to identify a piece of bone that appeared to be consistent with a tooth of some type that was subsequently removed.  Using a clean blade the joint was opened and given its proximity and likely contamination from the cat bite an arthrotomy was performed and thoroughly irrigated with at least 3 L of normal saline.    Further irrigation of the site occurred.  No further evidence of infection was identified.    Tourniquet was deflated with excellent reperfusion of the hand.  Some skin bleeding was identified but no evidence of deep vein venous bleeding or arterial bleeding and as such neurovascular structures were maintained without any evidence of damage.    I subsequent proceeded then to close the Samira incision using 3-0 nylon suture.  A loose closure along the dorsal radial aspect to allow for persistent drainage using 3-0 nylon's as well.  Xeroform was left in his Lemuel on the proximal and distal extents of the Samira incision volarly.  The remainder the wound was covered with Xeroform gauze.  A soft compressive dressing was applied.  Patient tolerated the procedure well no complications occurred.    An  assistant was used during this case.  Assistants SA Artemio helped in the positioning, prepping, draping, retracting and aiding in my ability to perform critical portions of this case and critical in the closure and dressing application.  Help from the assistant was critical in performing these functions.    Complications:  None; patient tolerated the procedure well.    Disposition: PACU - hemodynamically stable.  Condition: stable         Postoperative plan: Patient with an extensive infection involving the right middle finger.  Patient will require prolonged antibiotic coverage.  Will defer to infectious disease in regards to its length however would softly recommend IV antibiotic therapy for several weeks given patient's limitation in regards to p.o. medications due to allergies.  Will perform a dressing change after soaks and removal of christiano on postoperative day #1.  No lifting pulling or pushing with the right upper extremity.  May utilize thumb and index finger as tolerated.    Cesar Cardenas  Phone Number: [phone]

## 2025-06-22 NOTE — CARE PLAN
The patient's goals for the shift include      The clinical goals for the shift include rest, iv abx    Over the shift, the patient did make progress toward the following goals. Barriers to progression include . Recommendations to address these barriers include .    Problem: Pain - Adult  Goal: Verbalizes/displays adequate comfort level or baseline comfort level  Outcome: Progressing     Problem: Safety - Adult  Goal: Free from fall injury  Outcome: Progressing     Problem: Discharge Planning  Goal: Discharge to home or other facility with appropriate resources  Outcome: Progressing     Problem: Chronic Conditions and Co-morbidities  Goal: Patient's chronic conditions and co-morbidity symptoms are monitored and maintained or improved  Outcome: Progressing     Problem: Nutrition  Goal: Nutrient intake appropriate for maintaining nutritional needs  Outcome: Progressing

## 2025-06-22 NOTE — PROGRESS NOTES
"                                                                 Hospital Medicine Progress Note       Patient Name: Coretta Bird   YOB: 1960    Subjective:    Coretta Bird is a 65 y.o.yo female who is hospital day 2     No new events. The redness seems to have regressed more distally but fingers are more swollen.      Objective:    Recent labs, imaging, vital signs and notes from other providers were reviewed     /71 (BP Location: Right arm, Patient Position: Lying)   Pulse 83   Temp 36.4 °C (97.5 °F) (Temporal)   Resp 18   Ht 1.778 m (5' 10\")   Wt 61.2 kg (135 lb)   SpO2 98%   BMI 19.37 kg/m²     Physical Exam:    GENERAL: well developed, non-toxic, NAD, alert & cooperative  HEENT: normocephalic, atraumatic, sclera clear  CARDIAC: regular rate & rhythm, S1/S2  PULMONARY: CTA b/l, no respiratory distress, - wheezes  ABDOMEN: soft, non-tender, non-distended  MSK: R hand is swollen and erythematous on dorsal aspect, her 3rd digit is swollen with limited ROM  NEURO: A&O X 3, non-focal, CN II-XII grossly intact  SKIN: Warm and dry, no lesions, no rashes.  PSYCH: appropriate mood & affect     Assessment/Plan:    Cat Bite  Cellulitis/Tenosynovitis   Hypothyroidism  HLD  Anxiety/Depression   OA      Cont IV abx with Merrem per ID. Declined rabies vaccination. Current on tetanus   I did discuss care with Dr. Shay this morning. Patient likely needs OR washout. He is making arrangements with [facility].   Cont home meds including synthroid, wellbutrin, cymbalta, atarax       DVT Prophylaxis: lovenox  Code Status: FULL CODE  Disposition: home      Enrique Schneider, DO  Hospital Medicine    "

## 2025-06-22 NOTE — ANESTHESIA PROCEDURE NOTES
Airway  Date/Time: 6/22/2025 12:27 PM  Reason: elective    Airway not difficult    Staffing  Performed: CAA   Authorized by: Tarun Salamanca MD    Performed by: RONA Askew  Patient location during procedure: OR    Patient Condition  Indications for airway management: anesthesia  Planned trial extubation  Sedation level: deep     Final Airway Details   Preoxygenated: yes  Final airway type: supraglottic airway  Successful airway: classic  Size: 3  Number of attempts at approach: 1  Number of other approaches attempted: 0

## 2025-06-22 NOTE — ANESTHESIA PREPROCEDURE EVALUATION
Patient: Coretta Bird    Procedure Information       Date/Time: 06/22/25 1145    Procedure: DEBRIDEMENT, HAND (Right: Hand)    Location: [facility] / [facility]    Surgeons: Cesar Cardenas MD            Relevant Problems   Anesthesia (within normal limits)       Clinical information reviewed:   Tobacco  Allergies  Meds   Med Hx  Surg Hx   Fam Hx  Soc Hx        NPO Detail:  NPO/Void Status  Date of Last Liquid: 06/21/25  Time of Last Liquid: 2000  Date of Last Solid: 06/21/25  Time of Last Solid: 2000  Last Intake Type: Solid meal  Time of Last Void: 1136         Physical Exam    Airway  Mallampati: II  TM distance: >3 FB  Neck ROM: full  Mouth opening: 3 or more finger widths     Cardiovascular - normal exam  Rhythm: regular  Rate: normal     Dental - normal exam     Pulmonary - normal exam   Abdominal            Anesthesia Plan    History of general anesthesia?: yes  History of complications of general anesthesia?: no    ASA 2 - emergent     general     The patient is not a current smoker.    intravenous induction   Postoperative pain plan includes opioids.  Trial extubation is planned.  Anesthetic plan and risks discussed with patient.  Use of blood products discussed with patient who.    Plan discussed with CAA.

## 2025-06-23 LAB
ANION GAP SERPL CALC-SCNC: 11 MMOL/L (ref 10–20)
BUN SERPL-MCNC: 9 MG/DL (ref 6–23)
CALCIUM SERPL-MCNC: 8.5 MG/DL (ref 8.6–10.3)
CHLORIDE SERPL-SCNC: 103 MMOL/L (ref 98–107)
CO2 SERPL-SCNC: 27 MMOL/L (ref 21–32)
CREAT SERPL-MCNC: 0.68 MG/DL (ref 0.5–1.05)
EGFRCR SERPLBLD CKD-EPI 2021: >90 ML/MIN/1.73M*2
ERYTHROCYTE [DISTWIDTH] IN BLOOD BY AUTOMATED COUNT: 12.4 % (ref 11.5–14.5)
GLUCOSE SERPL-MCNC: 92 MG/DL (ref 74–99)
HCT VFR BLD AUTO: 30.2 % (ref 36–46)
HGB BLD-MCNC: 10 G/DL (ref 12–16)
MCH RBC QN AUTO: 31.3 PG (ref 26–34)
MCHC RBC AUTO-ENTMCNC: 33.1 G/DL (ref 32–36)
MCV RBC AUTO: 94 FL (ref 80–100)
NRBC BLD-RTO: 0 /100 WBCS (ref 0–0)
PLATELET # BLD AUTO: 201 X10*3/UL (ref 150–450)
POTASSIUM SERPL-SCNC: 4 MMOL/L (ref 3.5–5.3)
RBC # BLD AUTO: 3.2 X10*6/UL (ref 4–5.2)
SODIUM SERPL-SCNC: 137 MMOL/L (ref 136–145)
WBC # BLD AUTO: 6 X10*3/UL (ref 4.4–11.3)

## 2025-06-23 PROCEDURE — 2500000004 HC RX 250 GENERAL PHARMACY W/ HCPCS (ALT 636 FOR OP/ED): Performed by: ORTHOPAEDIC SURGERY

## 2025-06-23 PROCEDURE — 85027 COMPLETE CBC AUTOMATED: CPT | Performed by: ORTHOPAEDIC SURGERY

## 2025-06-23 PROCEDURE — 2500000001 HC RX 250 WO HCPCS SELF ADMINISTERED DRUGS (ALT 637 FOR MEDICARE OP): Performed by: ORTHOPAEDIC SURGERY

## 2025-06-23 PROCEDURE — 80048 BASIC METABOLIC PNL TOTAL CA: CPT | Performed by: ORTHOPAEDIC SURGERY

## 2025-06-23 PROCEDURE — 1100000001 HC PRIVATE ROOM DAILY

## 2025-06-23 PROCEDURE — 36415 COLL VENOUS BLD VENIPUNCTURE: CPT | Performed by: ORTHOPAEDIC SURGERY

## 2025-06-23 PROCEDURE — 2500000001 HC RX 250 WO HCPCS SELF ADMINISTERED DRUGS (ALT 637 FOR MEDICARE OP): Performed by: INTERNAL MEDICINE

## 2025-06-23 PROCEDURE — 2500000002 HC RX 250 W HCPCS SELF ADMINISTERED DRUGS (ALT 637 FOR MEDICARE OP, ALT 636 FOR OP/ED): Performed by: ORTHOPAEDIC SURGERY

## 2025-06-23 PROCEDURE — 99232 SBSQ HOSP IP/OBS MODERATE 35: CPT | Performed by: INTERNAL MEDICINE

## 2025-06-23 RX ORDER — AMOXICILLIN AND CLAVULANATE POTASSIUM 875; 125 MG/1; MG/1
1 TABLET, FILM COATED ORAL EVERY 12 HOURS SCHEDULED
Status: COMPLETED | OUTPATIENT
Start: 2025-06-23 | End: 2025-06-23

## 2025-06-23 RX ORDER — BISACODYL 5 MG
5 TABLET, DELAYED RELEASE (ENTERIC COATED) ORAL DAILY PRN
Status: DISCONTINUED | OUTPATIENT
Start: 2025-06-23 | End: 2025-06-24 | Stop reason: HOSPADM

## 2025-06-23 RX ADMIN — ACETAMINOPHEN 650 MG: 325 TABLET ORAL at 08:28

## 2025-06-23 RX ADMIN — AMOXICILLIN AND CLAVULANATE POTASSIUM 1 TABLET: 875; 125 TABLET, COATED ORAL at 20:51

## 2025-06-23 RX ADMIN — SODIUM CHLORIDE 1 G: 9 INJECTION, SOLUTION INTRAVENOUS at 06:02

## 2025-06-23 RX ADMIN — ACETAMINOPHEN 650 MG: 325 TABLET ORAL at 01:52

## 2025-06-23 RX ADMIN — ACETAMINOPHEN 650 MG: 325 TABLET ORAL at 23:08

## 2025-06-23 RX ADMIN — BISACODYL 5 MG: 5 TABLET, COATED ORAL at 14:50

## 2025-06-23 RX ADMIN — LEVOTHYROXINE SODIUM 25 MCG: 0.03 TABLET ORAL at 06:02

## 2025-06-23 RX ADMIN — ACETAMINOPHEN 650 MG: 325 TABLET ORAL at 18:00

## 2025-06-23 RX ADMIN — ACETAMINOPHEN 650 MG: 325 TABLET ORAL at 12:36

## 2025-06-23 RX ADMIN — BUPROPION HYDROCHLORIDE 150 MG: 150 TABLET, EXTENDED RELEASE ORAL at 08:28

## 2025-06-23 RX ADMIN — ENOXAPARIN SODIUM 30 MG: 30 INJECTION SUBCUTANEOUS at 14:50

## 2025-06-23 RX ADMIN — SODIUM CHLORIDE 1 G: 9 INJECTION, SOLUTION INTRAVENOUS at 23:12

## 2025-06-23 RX ADMIN — SENNOSIDES 17.2 MG: 8.6 TABLET, FILM COATED ORAL at 08:31

## 2025-06-23 RX ADMIN — ROSUVASTATIN CALCIUM 5 MG: 10 TABLET, FILM COATED ORAL at 08:28

## 2025-06-23 RX ADMIN — HYDROXYZINE HYDROCHLORIDE 25 MG: 25 TABLET, FILM COATED ORAL at 20:52

## 2025-06-23 RX ADMIN — DULOXETINE 60 MG: 30 CAPSULE, DELAYED RELEASE ORAL at 08:28

## 2025-06-23 RX ADMIN — SODIUM CHLORIDE 1 G: 9 INJECTION, SOLUTION INTRAVENOUS at 14:50

## 2025-06-23 RX ADMIN — CLONAZEPAM 0.25 MG: 0.5 TABLET ORAL at 20:59

## 2025-06-23 RX ADMIN — SODIUM CHLORIDE, SODIUM LACTATE, POTASSIUM CHLORIDE, AND CALCIUM CHLORIDE 100 ML/HR: .6; .31; .03; .02 INJECTION, SOLUTION INTRAVENOUS at 01:53

## 2025-06-23 RX ADMIN — ENOXAPARIN SODIUM 30 MG: 30 INJECTION SUBCUTANEOUS at 01:52

## 2025-06-23 ASSESSMENT — COGNITIVE AND FUNCTIONAL STATUS - GENERAL
DAILY ACTIVITIY SCORE: 24
MOBILITY SCORE: 24

## 2025-06-23 ASSESSMENT — PAIN SCALES - GENERAL: PAINLEVEL_OUTOF10: 0 - NO PAIN

## 2025-06-23 NOTE — PROGRESS NOTES
"Coretta Bird is a 65 y.o. female on day 3 of admission presenting with Cat bite of middle finger, initial encounter.        Subjective   Seen this AM.  Overall feeling better but did just have wash out of her hand yesterday.  She is tolerating PO intake.  Cultures obtained and pending.   She is aware she may need a long course of IV Abx.  I described the culture process and PICC line process and indications.  She is agreeable to plan.   Will await ortho and ID recommendations for plan going forward.         Objective     Last Recorded Vitals  /80 (BP Location: Right arm, Patient Position: Lying)   Pulse 78   Temp 36.9 °C (98.4 °F) (Temporal)   Resp 18   Ht 1.778 m (5' 10\")   Wt 61.2 kg (135 lb)   SpO2 96%   BMI 19.37 kg/m²     Intake/Output last 3 Shifts:  I/O last 3 completed shifts:  In: 558.3 (9.1 mL/kg) [I.V.:258.3 (4.2 mL/kg); IV Piggyback:300]  Out: 420 (6.9 mL/kg) [Urine:400 (0.2 mL/kg/hr); Blood:20]  Weight: 61.2 kg       =========    RELEVANT RESULTS      ==========    Labs  Lab Results   Component Value Date    WBC 6.0 06/23/2025    HGB 10.0 (L) 06/23/2025    HCT 30.2 (L) 06/23/2025    MCV 94 06/23/2025     06/23/2025     Lab Results   Component Value Date    GLUCOSE 92 06/23/2025    CALCIUM 8.5 (L) 06/23/2025     06/23/2025    K 4.0 06/23/2025    CO2 27 06/23/2025     06/23/2025    BUN 9 06/23/2025    CREATININE 0.68 06/23/2025      Lab Results   Component Value Date    ALT 12 06/20/2025    AST 19 06/20/2025    ALKPHOS 66 06/20/2025    BILITOT 1.6 (H) 06/20/2025          Exam     GENERAL:   no distress, alert and cooperative  HEENT: Normal Inspection, Mucous membranes moist, No JVD, No Lymphadenopathy  CARDIOVASCULAR: RRR, no murmurs, 2+ equal pulses of the extremities, normal S1 and S 2  RESPIRATORY: Patent airways, CTAB, thorax symmetric, No significant wheezing, Rales or Rhonchi  ABDOMEN: Soft, Non-Tender, Normal Bowel Sounds, No Distention  SKIN: Warm and dry, no " lesions, no rashes  EXTREMITIES: normal extremities,  right hand bandages in place  NEURO: A&O x 3, CN II-XII grossly intact  PSYCH: Appropriate mood and behavior        =======     SCHEDULED MEDICATIONS     =======  Scheduled Medications[1]    ==========     PRN MEDICATIONS      =========  clonazePAM, 0.25 mg, BID PRN  cyclobenzaprine, 10 mg, TID PRN  diphenhydrAMINE, 12.5 mg, q6h PRN   Or  diphenhydrAMINE, 12.5 mg, q6h PRN  melatonin, 3 mg, Nightly PRN  morphine, 2 mg, q2h PRN  naloxone, 0.2 mg, q5 min PRN  ondansetron ODT, 4 mg, q8h PRN   Or  ondansetron, 4 mg, q8h PRN  oxyCODONE, 10 mg, q4h PRN  oxyCODONE, 2.5 mg, q4h PRN  oxyCODONE, 5 mg, q6h PRN  promethazine, 25 mg, q6h PRN   Or  promethazine, 25 mg, q12h PRN        ==============     DIET     ==============  Dietary Orders (From admission, onward)       Start     Ordered    06/22/25 1450  Adult diet Regular  Diet effective now        Question:  Diet type  Answer:  Regular    06/22/25 1449    06/20/25 1127  May Participate in Room Service  ( ROOM SERVICE MAY PARTICIPATE)  Once        Question:  .  Answer:  Yes    06/20/25 1126                    ======    Assessment/Plan      =======    ASSESSMENT:  Assessment & Plan  Cat bite of middle finger, initial encounter    Cellulitis of finger of right hand        ___________________________________________________    1. Cat Bite  2. Cellulitis/Tenosynovitis   3. Hypothyroidism  4. HLD  5. Anxiety/Depression   6. OA      PLAN:    Cont. IV Abx as per ID - Merrem  Ortho following with washout in OR 6/22.    Vitals and labs stable at this time.   Wound culture pending.           DVT Prophylaxis  Last Anticoag Admin            enoxaparin (Lovenox) syringe 30 mg    Given 30 mg at 0152    Frequency: Every 12 hours         There are additional administrations since 06/20/25 1021 that are not shown.    No unadministered anticoagulant orders found.          Disclaimer: The timestamp of this note indicates when it was  documented and does not necessarily correspond to the time the patient was evaluated or seen.        Ankush Mcallister DO         [1]   Scheduled medications   Medication Dose Route Frequency    acetaminophen  650 mg oral q6h CASSANDRA    buPROPion XL  150 mg oral Daily    DULoxetine  60 mg oral Daily    enoxaparin  30 mg subcutaneous q12h    hydrOXYzine HCL  25 mg oral Nightly    levothyroxine  25 mcg oral Daily    meropenem  1 g intravenous q8h    rosuvastatin  5 mg oral Daily    sennosides  2 tablet oral BID

## 2025-06-23 NOTE — PROGRESS NOTES
"Coretta Bird is a 65 y.o. female on day 3 of admission presenting with Cat bite of middle finger, initial encounter.    Subjective   Patient doing well overall.  Reports pain in the finger has substantially improved compared to preoperative symptoms.       Objective     Physical Exam    Dressing change was performed about the right upper extremity.  Drains were pulled and minimal persistent cloudy fluid was appreciated about the palmar region.  Patient is able obtain full extension of the digit and substantial reduction in swelling is appreciated.  Sensation grossly intact to light touch throughout the digit.    Soak was performed with half hydroperoxide half water.    Last Recorded Vitals  Blood pressure 143/84, pulse 83, temperature 36 °C (96.8 °F), temperature source Temporal, resp. rate 18, height 1.778 m (5' 10\"), weight 61.2 kg (135 lb), SpO2 98%.  Intake/Output last 3 Shifts:  I/O last 3 completed shifts:  In: 2700 (44.1 mL/kg) [I.V.:2000 (32.7 mL/kg); IV Piggyback:700]  Out: 420 (6.9 mL/kg) [Urine:400 (0.2 mL/kg/hr); Blood:20]  Weight: 61.2 kg     Relevant Results      Scheduled medications  Scheduled Medications[1]  Continuous medications  Continuous Medications[2]  PRN medications  PRN Medications[3]  Results for orders placed or performed during the hospital encounter of 06/20/25 (from the past 24 hours)   CBC   Result Value Ref Range    WBC 6.0 4.4 - 11.3 x10*3/uL    nRBC 0.0 0.0 - 0.0 /100 WBCs    RBC 3.20 (L) 4.00 - 5.20 x10*6/uL    Hemoglobin 10.0 (L) 12.0 - 16.0 g/dL    Hematocrit 30.2 (L) 36.0 - 46.0 %    MCV 94 80 - 100 fL    MCH 31.3 26.0 - 34.0 pg    MCHC 33.1 32.0 - 36.0 g/dL    RDW 12.4 11.5 - 14.5 %    Platelets 201 150 - 450 x10*3/uL   Basic metabolic panel   Result Value Ref Range    Glucose 92 74 - 99 mg/dL    Sodium 137 136 - 145 mmol/L    Potassium 4.0 3.5 - 5.3 mmol/L    Chloride 103 98 - 107 mmol/L    Bicarbonate 27 21 - 32 mmol/L    Anion Gap 11 10 - 20 mmol/L    Urea Nitrogen 9 6 - " 23 mg/dL    Creatinine 0.68 0.50 - 1.05 mg/dL    eGFR >90 >60 mL/min/1.73m*2    Calcium 8.5 (L) 8.6 - 10.3 mg/dL                            Assessment & Plan  Cat bite of middle finger, initial encounter    Cellulitis of finger of right hand    Postop day #1 status post irrigation and debridement of right middle finger    Pain control: Scheduled Tylenol limit narcotic medication    No lifting pulling or pushing with the right upper extremity    Wound care: Will consult wound care nurses with plan for dressing change every other day with Xeroform, 4 x 4 gauze and Ace wrap.  Would recommend soaks with half hydroperoxide half water during dressing change.    Disposition: Will await infectious disease final recommendations okay for DC once medically appropriate.    Follow-up in office in 2 weeks time.    Cesar Cardenas MD           [1] acetaminophen, 650 mg, oral, q6h CASSANDRA  amoxicillin-clavulanate, 1 tablet, oral, q12h CASSANDRA  buPROPion XL, 150 mg, oral, Daily  DULoxetine, 60 mg, oral, Daily  enoxaparin, 30 mg, subcutaneous, q12h  hydrOXYzine HCL, 25 mg, oral, Nightly  levothyroxine, 25 mcg, oral, Daily  meropenem, 1 g, intravenous, q8h  rosuvastatin, 5 mg, oral, Daily  sennosides, 2 tablet, oral, BID    [2]    [3] PRN medications: bisacodyl, clonazePAM, cyclobenzaprine, diphenhydrAMINE **OR** diphenhydrAMINE, melatonin, morphine, naloxone, ondansetron ODT **OR** ondansetron, oxyCODONE, oxyCODONE, oxyCODONE, promethazine **OR** promethazine

## 2025-06-23 NOTE — PROGRESS NOTES
06/23/25 1136   Discharge Planning   Living Arrangements Alone   Support Systems Family members   Assistance Needed independent   Type of Residence Private residence   Number of Stairs to Enter Residence 1   Number of Stairs Within Residence   (split level)   Do you have animals or pets at home? Yes   Type of Animals or Pets 2 cats   Who is requesting discharge planning? Provider   Home or Post Acute Services In home services   Type of Home Care Services Home nursing visits   Expected Discharge Disposition Home H   Does the patient need discharge transport arranged? No   Intensity of Service   Intensity of Service 0-30 min     Met with patient at bedside. Introduced self and role as care coordinator. Demographics verified. PCP is Sarina from [facility]. Insurance is medicare and [employer] [place]. Patient is independent with ADL's. Patient has been seen walking the halls with no assistive devices. Patient is aware she may need home IV antibiotics. Processed explained to patient.  Care coordinators will follow for discharge planning.

## 2025-06-23 NOTE — PROGRESS NOTES
INPATIENT PROGRESS NOTES    PATIENT NAME: Coretta Bird    MRN: [id number]  SERVICE DATE:  6/23/2025   SERVICE TIME:  3:17 PM    SIGNATURE: Kole Chiang MD      ASSESSMENT :   Cat bite with right third finger tenosynovitis and hand cellulitis, status post I&D and cat tooth removal on 6/22.  Allergic to penicillin and Bactrim with rash       Other issues:  #Hyperlipidemia  #Osteoarthritis  #Hypothyroidism       PLAN:  - Follow-up OR cultures  - Continue meropenem  -Reported allergy to penicillin when she was a child and it was only a rash, she thinks she did receive amoxicillin in the past for dental procedures.  I will challenge her with 1 dose of Augmentin   -Closely monitor for antibiotics side effects including rash, Diarrhea/CDI, thrombocytopenia, SUSAN, etc.    Discussed with the nurse     SUBJECTIVE  Afebrile  No events overnight       OBJECTIVE  PHYSICAL EXAM:   Patient Vitals for the past 24 hrs:   BP Temp Temp src Pulse Resp SpO2   06/23/25 0731 127/80 36.9 °C (98.4 °F) Temporal 78 18 96 %   06/23/25 0412 140/81 36 °C (96.8 °F) Temporal 76 16 99 %   06/23/25 0008 117/64 36.1 °C (97 °F) -- 75 -- 95 %   06/22/25 2017 120/69 36.7 °C (98.1 °F) Temporal 82 20 96 %   06/22/25 1556 155/84 36.8 °C (98.2 °F) Temporal 84 18 93 %   06/22/25 1540 155/84 36.8 °C (98.2 °F) -- 88 18 92 %       Right hand is wrapped    Labs:  Lab Results   Component Value Date    WBC 6.0 06/23/2025    HGB 10.0 (L) 06/23/2025    HCT 30.2 (L) 06/23/2025    MCV 94 06/23/2025     06/23/2025     Lab Results   Component Value Date    GLUCOSE 92 06/23/2025    CALCIUM 8.5 (L) 06/23/2025     06/23/2025    K 4.0 06/23/2025    CO2 27 06/23/2025     06/23/2025    BUN 9 06/23/2025    CREATININE 0.68 06/23/2025   ESR: --  Lab Results   Component Value Date    SEDRATE 5 06/20/2025     Lab Results   Component Value Date    CRP 1.27 (H) 06/22/2025     Lab Results   Component Value Date    ALT 12 06/20/2025    AST 19 06/20/2025     "ALKPHOS 66 06/20/2025    BILITOT 1.6 (H) 06/20/2025         DATA:   Diagnostic tests reviewed for today's visit:    Labs this admission reviewed  Imagings this admission reviewed  Cultures: Reviewed        Kole Chiang MD.   Infectious Disease Attending            This note was partially created using voice recognition software and is inherently subject to errors including those of syntax and \"sound-alike\" substitutions which may escape proofreading. In such instances, original meaning may be extrapolated by contextual derivation       "

## 2025-06-23 NOTE — CARE PLAN
The patient's goals for the shift include      The clinical goals for the shift include rest, iv abx      Problem: Pain - Adult  Goal: Verbalizes/displays adequate comfort level or baseline comfort level  Outcome: Progressing     Problem: Safety - Adult  Goal: Free from fall injury  Outcome: Progressing     Problem: Discharge Planning  Goal: Discharge to home or other facility with appropriate resources  Outcome: Progressing     Problem: Chronic Conditions and Co-morbidities  Goal: Patient's chronic conditions and co-morbidity symptoms are monitored and maintained or improved  Outcome: Progressing     Problem: Nutrition  Goal: Nutrient intake appropriate for maintaining nutritional needs  Outcome: Progressing

## 2025-06-24 ENCOUNTER — PHARMACY VISIT (OUTPATIENT)
Dept: PHARMACY | Facility: CLINIC | Age: 65
End: 2025-06-24
Payer: COMMERCIAL

## 2025-06-24 VITALS
HEART RATE: 77 BPM | HEIGHT: 70 IN | SYSTOLIC BLOOD PRESSURE: 140 MMHG | WEIGHT: 135 LBS | RESPIRATION RATE: 16 BRPM | BODY MASS INDEX: 19.33 KG/M2 | OXYGEN SATURATION: 99 % | TEMPERATURE: 97.7 F | DIASTOLIC BLOOD PRESSURE: 87 MMHG

## 2025-06-24 LAB
BACTERIA SPEC CULT: NORMAL
BACTERIA SPEC CULT: NORMAL
GRAM STN SPEC: NORMAL

## 2025-06-24 PROCEDURE — 2500000004 HC RX 250 GENERAL PHARMACY W/ HCPCS (ALT 636 FOR OP/ED): Performed by: ORTHOPAEDIC SURGERY

## 2025-06-24 PROCEDURE — 2500000001 HC RX 250 WO HCPCS SELF ADMINISTERED DRUGS (ALT 637 FOR MEDICARE OP): Performed by: ORTHOPAEDIC SURGERY

## 2025-06-24 PROCEDURE — RXMED WILLOW AMBULATORY MEDICATION CHARGE

## 2025-06-24 PROCEDURE — 99221 1ST HOSP IP/OBS SF/LOW 40: CPT | Performed by: CLINICAL NURSE SPECIALIST

## 2025-06-24 PROCEDURE — 99239 HOSP IP/OBS DSCHRG MGMT >30: CPT | Performed by: INTERNAL MEDICINE

## 2025-06-24 PROCEDURE — 2500000002 HC RX 250 W HCPCS SELF ADMINISTERED DRUGS (ALT 637 FOR MEDICARE OP, ALT 636 FOR OP/ED): Performed by: ORTHOPAEDIC SURGERY

## 2025-06-24 RX ORDER — DIPHENHYDRAMINE HCL 25 MG
50 CAPSULE ORAL EVERY 6 HOURS PRN
Qty: 60 CAPSULE | Refills: 0 | Status: SHIPPED | OUTPATIENT
Start: 2025-06-24

## 2025-06-24 RX ORDER — FAMOTIDINE 20 MG/1
20 TABLET, FILM COATED ORAL DAILY PRN
Qty: 30 TABLET | Refills: 0 | Status: SHIPPED | OUTPATIENT
Start: 2025-06-24

## 2025-06-24 RX ORDER — AMOXICILLIN AND CLAVULANATE POTASSIUM 875; 125 MG/1; MG/1
1 TABLET, FILM COATED ORAL 2 TIMES DAILY
Qty: 28 TABLET | Refills: 0 | Status: SHIPPED | OUTPATIENT
Start: 2025-06-24 | End: 2025-07-08

## 2025-06-24 RX ADMIN — ACETAMINOPHEN 650 MG: 325 TABLET ORAL at 12:59

## 2025-06-24 RX ADMIN — BUPROPION HYDROCHLORIDE 150 MG: 150 TABLET, EXTENDED RELEASE ORAL at 09:19

## 2025-06-24 RX ADMIN — DULOXETINE 60 MG: 30 CAPSULE, DELAYED RELEASE ORAL at 09:18

## 2025-06-24 RX ADMIN — SODIUM CHLORIDE 1 G: 9 INJECTION, SOLUTION INTRAVENOUS at 13:00

## 2025-06-24 RX ADMIN — SODIUM CHLORIDE 1 G: 9 INJECTION, SOLUTION INTRAVENOUS at 06:32

## 2025-06-24 RX ADMIN — LEVOTHYROXINE SODIUM 25 MCG: 0.03 TABLET ORAL at 06:32

## 2025-06-24 RX ADMIN — ACETAMINOPHEN 650 MG: 325 TABLET ORAL at 06:32

## 2025-06-24 RX ADMIN — ENOXAPARIN SODIUM 30 MG: 30 INJECTION SUBCUTANEOUS at 03:45

## 2025-06-24 RX ADMIN — ROSUVASTATIN CALCIUM 5 MG: 10 TABLET, FILM COATED ORAL at 09:19

## 2025-06-24 ASSESSMENT — PAIN SCALES - GENERAL
PAINLEVEL_OUTOF10: 0 - NO PAIN
PAINLEVEL_OUTOF10: 0 - NO PAIN

## 2025-06-24 NOTE — DISCHARGE SUMMARY
Patient Name: Coretta Bird   YOB: 1960    Discharge Diagnosis: Cat bite of middle finger, initial encounter   Discharge Date: 06/24/25  Discharge Location: Home w/ [facility]    Hospital Course:  Coretta Bird is a pleasant 65 year old female past medical history anxiety, depression, hypothyroidism, lumbar DJD who presented to [facility] for right hand pain.  Patient states she got bit by a cat yesterday in her right middle finger.  She was seen in an urgent care and was started on doxycycline, she has a history of penicillin allergy.  The swelling got worse so she went to the hospital to be evaluated.  Orthopedic hand specialist notified and recommended IV antibiotics MRI of the hand.  Patient admitted, started on IV antibiotics.  MRI of the hand showed subcutaneous soft tissue edema enhancement but no definite, evidence to suggest infectious process around the joint.  Patient went for debridement of hand with orthopedic surgery and tolerated well.  Infectious disease managed antibiotics.  Patient reported that her allergy to penicillin was when she was a child and it was a rash.  She thinks she has received amoxicillin in the past for dental procedures.  Patient was challenged with 1 dose of Augmentin, she did not develop any rash or any other allergic reactions to this.  Wound cultures showed no growth to date, orthopedic surgery cleared patient for discharge.  Discussed with infectious disease and she will be discharged on oral Augmentin twice a day for 2 weeks, follow-up with orthopedic surgery and infectious disease.  She cannot pull push or lift using her right upper extremity, wound care orders per orthopedic surgery every other day placed with home health care.    Time Spent: 40 minutes      Physical Exam:     Vitals:    06/23/25 1500 06/23/25 1923 06/24/25 0613 06/24/25 0757   BP: 143/84 151/74  136/77 140/87   BP Location: Right arm Right arm Left arm    Patient Position: Lying Lying Lying Lying   Pulse: 83 89 80 77   Resp: 18 17 18 16   Temp: 36 °C (96.8 °F) 37.4 °C (99.3 °F) 36.4 °C (97.5 °F) 36.5 °C (97.7 °F)   TempSrc: Temporal Temporal Temporal Temporal   SpO2: 98% 97%  99%   Weight:       Height:           GEN: Not in acute distress. Alert and cooperative.  HEENT: Normocephalic and atraumatic.  Mucous membranes moist.  Clear sclera, PERRL, EOMI.  CARDIO: Regular rate and rhythm.  No murmurs, rubs, or gallops. No LE edema  RESP: Clear to auscultation b/l. No wheezes, rales or rhonchi. Normal effort.   ABD: +BS x4, soft, non-tender. Not distended. No rebound or guarding.  :   MSK: Grossly normal inspection. No joint swelling or obvious deformities. ROM intact  NEURO: A&O X 3. CN II-XII are grossly intact. No focal deficits.   SKIN: Warm and dry, no lesions, no rashes.  PSYCH: Appropriate mood and affect.      Discharge Medications:     Your medication list        START taking these medications        Instructions Last Dose Given Next Dose Due   amoxicillin-clavulanate 875-125 mg tablet  Commonly known as: Augmentin      Take 1 tablet by mouth 2 times a day for 14 days.       diphenhydrAMINE 50 mg capsule  Commonly known as: BENADryl      Take 1 capsule (50 mg) by mouth every 6 hours if needed for itching or other (Rash).       famotidine 20 mg tablet  Commonly known as: Pepcid      Take 1 tablet (20 mg) by mouth once daily as needed for heartburn (allergies, rash).              CONTINUE taking these medications        Instructions Last Dose Given Next Dose Due   buPROPion  mg 24 hr tablet  Commonly known as: Wellbutrin XL           cholecalciferol (vitamin D3) 250 mcg (10,000 unit) tablet           clonazePAM 0.5 mg tablet  Commonly known as: KlonoPIN           DULoxetine 60 mg DR capsule  Commonly known as: Cymbalta           DULoxetine 30 mg DR capsule  Commonly known as: Cymbalta            hydrOXYzine HCL 25 mg tablet  Commonly known as: Atarax           levothyroxine 25 mcg tablet  Commonly known as: Synthroid, Levoxyl           ondansetron ODT 4 mg disintegrating tablet  Commonly known as: Zofran-ODT      Dissolve 1 tablet (4 mg) in the mouth every 12 hours if needed for nausea or vomiting for up to 5 days.       rosuvastatin 5 mg tablet  Commonly known as: Crestor                  STOP taking these medications      doxycycline 100 mg capsule  Commonly known as: Vibramycin                  Where to Get Your Medications        These medications were sent to [facility]  [address] [place] [address]      Hours: 8:30 AM to 5:00 PM Mon - Fri Phone: [phone]   amoxicillin-clavulanate 875-125 mg tablet  diphenhydrAMINE 50 mg capsule  famotidine 20 mg tablet             Karl Chauhan DO  Senior Attending Physician  Hospital Medicine  Clinical Instructor

## 2025-06-24 NOTE — NURSING NOTE
Met with patient at the bedside. Discharge Planning discussed. AVS updated with follow-ups, education, and medication information. Verified/ entered a PCP and pharmacy. New medications and side effects discussed. Discussed follow up appointments. Discussed drug allergies. Discussed signs and symptoms of when to call the DR or return to the ER. All questions answered.  Updated nurse on this info. AVS printed and hi-lighted. IV removed.

## 2025-06-24 NOTE — PROGRESS NOTES
INPATIENT PROGRESS NOTES    PATIENT NAME: Coretta Bird    MRN: [id number]  SERVICE DATE:  6/24/2025   SERVICE TIME:  3:17 PM    SIGNATURE: Kole Chiang MD      ASSESSMENT :   Cat bite with right third finger tenosynovitis and hand cellulitis, status post I&D and cat tooth removal on 6/22.  Allergic to penicillin and Bactrim with rash       Other issues:  #Hyperlipidemia  #Osteoarthritis  #Hypothyroidism       PLAN:  -OR culture no growth  -Tolerated Augmentin with no side effects this admission  -Penicillin allergy removed from the list  -The patient can be discharged on Augmentin for 2 to 3 weeks  -Follow-up with Dr. Swartz in 2 weeks.  -Closely monitor for antibiotics side effects including rash, Diarrhea/CDI, thrombocytopenia, SUSAN, etc.      SUBJECTIVE  Afebrile  No events overnight       OBJECTIVE  PHYSICAL EXAM:   Patient Vitals for the past 24 hrs:   BP Temp Temp src Pulse Resp SpO2   06/24/25 0757 140/87 36.5 °C (97.7 °F) Temporal 77 16 99 %   06/24/25 0613 136/77 36.4 °C (97.5 °F) Temporal 80 18 --   06/23/25 1923 151/74 37.4 °C (99.3 °F) Temporal 89 17 97 %       Right hand is wrapped    Labs:  Lab Results   Component Value Date    WBC 6.0 06/23/2025    HGB 10.0 (L) 06/23/2025    HCT 30.2 (L) 06/23/2025    MCV 94 06/23/2025     06/23/2025     Lab Results   Component Value Date    GLUCOSE 92 06/23/2025    CALCIUM 8.5 (L) 06/23/2025     06/23/2025    K 4.0 06/23/2025    CO2 27 06/23/2025     06/23/2025    BUN 9 06/23/2025    CREATININE 0.68 06/23/2025   ESR: --  Lab Results   Component Value Date    SEDRATE 5 06/20/2025     Lab Results   Component Value Date    CRP 1.27 (H) 06/22/2025     Lab Results   Component Value Date    ALT 12 06/20/2025    AST 19 06/20/2025    ALKPHOS 66 06/20/2025    BILITOT 1.6 (H) 06/20/2025         DATA:   Diagnostic tests reviewed for today's visit:    Labs this admission reviewed  Imagings this admission reviewed  Cultures: Reviewed        Kole  "Андрей SY.   Infectious Disease Attending            This note was partially created using voice recognition software and is inherently subject to errors including those of syntax and \"sound-alike\" substitutions which may escape proofreading. In such instances, original meaning may be extrapolated by contextual derivation       "

## 2025-06-24 NOTE — PROGRESS NOTES
"Coretta Bird is a 65 y.o. female on day 4 of admission presenting with Cat bite of middle finger, initial encounter.    Subjective   Patient sitting up in chair stating she is experiencing minimal amount of pain and has only required tylenol       Objective     Physical Exam  Vitals reviewed.   Musculoskeletal:      Comments: Ace wrap dressing D&I to left hand   Neurological:      Mental Status: She is alert.         Last Recorded Vitals  Blood pressure 140/87, pulse 77, temperature 36.5 °C (97.7 °F), temperature source Temporal, resp. rate 16, height 1.778 m (5' 10\"), weight 61.2 kg (135 lb), SpO2 99%.  Intake/Output last 3 Shifts:  I/O last 3 completed shifts:  In: 2141.7 (35 mL/kg) [I.V.:1741.7 (28.4 mL/kg); IV Piggyback:400]  Out: 600 (9.8 mL/kg) [Urine:600 (0.3 mL/kg/hr)]  Weight: 61.2 kg     Relevant Results      Scheduled medications  Scheduled Medications[1]  Continuous medications  Continuous Medications[2]  PRN medications  PRN Medications[3]  No results found for this or any previous visit (from the past 24 hours).                         Assessment & Plan  Cat bite of middle finger, initial encounter    Cellulitis of finger of right hand  POD #2 s/p I&D left finger     Begin every other day dressing changes with hand soaks.  Anticipate need for [facility] services to assist with dressing changes  Patient may be discharged home from orthopedic standpoint with followup in 2 weeks          Colette Hussein APRN-CNS           [1] acetaminophen, 650 mg, oral, q6h CASSANDRA  buPROPion XL, 150 mg, oral, Daily  DULoxetine, 60 mg, oral, Daily  enoxaparin, 30 mg, subcutaneous, q12h  hydrOXYzine HCL, 25 mg, oral, Nightly  levothyroxine, 25 mcg, oral, Daily  meropenem, 1 g, intravenous, q8h  rosuvastatin, 5 mg, oral, Daily  sennosides, 2 tablet, oral, BID    [2]    [3] PRN medications: bisacodyl, clonazePAM, cyclobenzaprine, diphenhydrAMINE **OR** diphenhydrAMINE, melatonin, morphine, naloxone, ondansetron ODT **OR** " ondansetron, oxyCODONE, oxyCODONE, oxyCODONE, promethazine **OR** promethazine

## 2025-06-24 NOTE — PROGRESS NOTES
06/24/25 1231   Discharge Planning   Home or Post Acute Services In home services   Type of Home Care Services Home nursing visits  (Wound Care)   Expected Discharge Disposition Home H   Does the patient need discharge transport arranged? No   Patient Choice   Provider Choice list and CMS website (https://medicare.gov/care-compare#search) for post-acute Quality and Resource Measure Data were provided and reviewed with: Patient   Patient / Family choosing to utilize agency / facility established prior to hospitalization No   Intensity of Service   Intensity of Service 0-30 min     TCC met with patient at bedside regarding HHC.  Patient has a [facility] primary care MD.  HHC choice list provided to patient and patient would like referrals placed to [facility] and [facility].  Care transitions to follow.      1327pm:  Patient accepted by [facility].  Care transitions to follow.  All discharge paperwork and orders sent to [facility].

## 2025-06-24 NOTE — DISCHARGE INSTRUCTIONS
She cannot pull push or lift using her right upper extremity.    Dressing change every other day with Xeroform, 4 x 4 gauze and Ace wrap. Would recommend soaks with half hydroperoxide half water during dressing change.

## 2025-07-07 ENCOUNTER — APPOINTMENT (OUTPATIENT)
Dept: ORTHOPEDIC SURGERY | Facility: CLINIC | Age: 65
End: 2025-07-07
Payer: MEDICARE

## 2025-07-07 DIAGNOSIS — L03.011 CELLULITIS OF FINGER OF RIGHT HAND: Primary | ICD-10-CM

## 2025-07-07 PROCEDURE — 1111F DSCHRG MED/CURRENT MED MERGE: CPT | Performed by: ORTHOPAEDIC SURGERY

## 2025-07-07 PROCEDURE — 99024 POSTOP FOLLOW-UP VISIT: CPT | Performed by: ORTHOPAEDIC SURGERY

## 2025-07-07 NOTE — PROGRESS NOTES
History of Present Illness  Chief Complaint   Patient presents with    Right Hand - Post-op     ALEJANDRA 6/22/25       Patient doing okay overall.  Has not been bending her finger.  Has been performing local wound care as well as taking antibiotics appropriately status post I&D of right middle finger.  Patient does report some numbness in the finger somewhat diffusely.    Medical History[1]    Medication Documentation Review Audit       Reviewed by Angely Burns RN (Registered Nurse) on 06/22/25 at 1134      Medication Order Taking? Sig Documenting Provider Last Dose Status   buPROPion XL (Wellbutrin XL) 150 mg 24 hr tablet [id number]  Take 1 tablet (150 mg) by mouth once daily. Historical Provider, MD  Active   cholecalciferol, vitamin D3, 250 mcg (10,000 unit) tablet [id number]  Take by mouth once daily. Historical Provider, MD  Active   clonazePAM (KlonoPIN) 0.5 mg tablet [id number]  Take by mouth 2 times a day as needed for anxiety. Historical Provider, MD  Active   doxycycline (Vibramycin) 100 mg capsule [id number]  Take 1 capsule (100 mg) by mouth 2 times a day for 10 days. Take with at least 8 ounces (large glass) of water, do not lie down for 30 minutes after Julieta Loza PA-C  Flag for Review   DULoxetine (Cymbalta) 30 mg DR capsule [id number]  Take 1 capsule (30 mg) by mouth once daily. Historical Provider, MD  Active   DULoxetine (Cymbalta) 60 mg DR capsule [id number]  Take 1 capsule (60 mg) by mouth once daily. Historical Provider, MD  Active   hydrOXYzine HCL (Atarax) 25 mg tablet [id number]  Take 1 tablet (25 mg) by mouth once daily at bedtime. Historical Provider, MD  Active   levothyroxine (Synthroid, Levoxyl) 25 mcg tablet [id number]  Take 1 tablet (25 mcg) by mouth early in the morning.. Take on an empty stomach at the same time each day, either 30 to 60 minutes prior to breakfast Historical Provider, MD  Active   ondansetron ODT (Zofran-ODT) 4 mg disintegrating tablet [id number]  Dissolve 1 tablet (4 mg) in  the mouth every 12 hours if needed for nausea or vomiting for up to 5 days. Julieta Loza PA-C  Active   rosuvastatin (Crestor) 5 mg tablet [id number]  Take 1 tablet (5 mg) by mouth once daily. Historical Provider, MD  Active                    RX Allergies[2]    Social History     Socioeconomic History    Marital status:      Spouse name: Not on file    Number of children: Not on file    Years of education: Not on file    Highest education level: Not on file   Occupational History    Not on file   Tobacco Use    Smoking status: Former     Types: Cigarettes    Smokeless tobacco: Never   Vaping Use    Vaping status: Never Used   Substance and Sexual Activity    Alcohol use: Not Currently    Drug use: Never    Sexual activity: Not on file   Other Topics Concern    Not on file   Social History Narrative    Not on file     Social Drivers of Health     Financial Resource Strain: Low Risk  (6/20/2025)    Overall Financial Resource Strain (CARDIA)     Difficulty of Paying Living Expenses: Not very hard   Food Insecurity: No Food Insecurity (6/20/2025)    Hunger Vital Sign     Worried About Running Out of Food in the Last Year: Never true     Ran Out of Food in the Last Year: Never true   Transportation Needs: No Transportation Needs (6/20/2025)    PRAPARE - Transportation     Lack of Transportation (Medical): No     Lack of Transportation (Non-Medical): No   Physical Activity: Sufficiently Active (7/21/2024)    Received from [facility]    Exercise Vital Sign     Days of Exercise per Week: 4 days     Minutes of Exercise per Session: 40 min   Stress: No Stress Concern Present (7/21/2024)    Received from [facility]    [place] [employer] - Occupational Stress Questionnaire     Feeling of Stress : Only a little   Social Connections: Unknown (7/21/2024)    Received from [facility]    Social Connection and Isolation Panel [NHANES]     Frequency of Communication with Friends  and Family: Three times a week     Frequency of Social Gatherings with Friends and Family: Three times a week     Attends Mosque Services: More than 4 times per year     Active Member of Clubs or Organizations: Not on file     Attends Club or Organization Meetings: Patient declined     Marital Status:    Intimate Partner Violence: Not At Risk (6/20/2025)    Humiliation, Afraid, Rape, and Kick questionnaire     Fear of Current or Ex-Partner: No     Emotionally Abused: No     Physically Abused: No     Sexually Abused: No   Housing Stability: Low Risk  (6/20/2025)    Housing Stability Vital Sign     Unable to Pay for Housing in the Last Year: No     Number of Times Moved in the Last Year: 1     Homeless in the Last Year: No       Surgical History[3]         Review of Systems   GENERAL: Negative for malaise, significant weight loss, fever  MUSCULOSKELETAL: see HPI  NEURO:  Negative      Exam  Right hand: Substantial reduction in swelling about the middle finger and palm.  Patient has difficulty actively flexing the middle finger however after passive motion of the middle finger patient is able to fire both FDP and FDS appropriately.  Expected stiffness appreciated in flexion of the digit.  Sutures were subsequently removed and Steri-Strips were applied.  Mild dehiscence appreciated in the palm as expected given presence of surgery in the region.  Present but decreased sensation diffusely in the right middle finger.     Imaging  None       Assessment  2 weeks status post I&D right middle finger     Plan  Patient doing fairly well at this time.  Decreased sensation almost certainly due to increased swelling in the digit and proximity to surgery.  Patient's nerve was intact on both branches of the palm and should continue to improve as time passes.  Recommend initiation of hand therapy to focus on passive and active motion of the right middle finger.  No further need for soaks of the right hand and should  complete her antibiotics and then further antibiotic treatment should be required.  Recommend follow-up in 1 month for repeat clinical assessment.          [1]   Past Medical History:  Diagnosis Date    Personal history of other diseases of the musculoskeletal system and connective tissue     History of arthritis    Personal history of other diseases of the musculoskeletal system and connective tissue     History of backache    Personal history of other mental and behavioral disorders     History of depression   [2]   Allergies  Allergen Reactions    Doxycycline GI Upset     Nausea, upset stomach    Erythromycin GI Upset     Nausea, upset stomach    Sulfamethoxazole Rash   [3]   Past Surgical History:  Procedure Laterality Date    APPENDECTOMY  11/08/2016    Appendectomy    VARICOSE VEIN SURGERY  11/08/2016    Varicose Vein Ligation

## 2025-07-09 ENCOUNTER — APPOINTMENT (OUTPATIENT)
Dept: WOUND CARE | Facility: CLINIC | Age: 65
End: 2025-07-09
Payer: MEDICARE

## 2025-07-10 ENCOUNTER — APPOINTMENT (OUTPATIENT)
Dept: OCCUPATIONAL THERAPY | Facility: CLINIC | Age: 65
End: 2025-07-10
Payer: MEDICARE

## 2025-07-14 ENCOUNTER — EVALUATION (OUTPATIENT)
Dept: OCCUPATIONAL THERAPY | Facility: CLINIC | Age: 65
End: 2025-07-14
Payer: MEDICARE

## 2025-07-14 DIAGNOSIS — L03.011 CELLULITIS OF FINGER OF RIGHT HAND: ICD-10-CM

## 2025-07-14 PROCEDURE — 97165 OT EVAL LOW COMPLEX 30 MIN: CPT | Mod: GO

## 2025-07-14 PROCEDURE — 97110 THERAPEUTIC EXERCISES: CPT | Mod: GO

## 2025-07-14 ASSESSMENT — PATIENT HEALTH QUESTIONNAIRE - PHQ9
SUM OF ALL RESPONSES TO PHQ9 QUESTIONS 1 AND 2: 0
1. LITTLE INTEREST OR PLEASURE IN DOING THINGS: NOT AT ALL
2. FEELING DOWN, DEPRESSED OR HOPELESS: NOT AT ALL

## 2025-07-14 NOTE — PROGRESS NOTES
"Occupational Therapy Upper Extremity Evaluation Note    Date: 2025    Time In:1444  Time Out:1520  Total Time: 36 minutes    Charges:  HC OT OCCUPATIONAL THERAPY EVAL LOW COMPLEX 30 MINS  51908 (CPT®)  HC OT THERAPEUTIC EXERCISES  69816 (CPT®)  16 minutes 1 unit    NAME: Coretta Bird  : 1960  MRN: [id number]    Chart reviewed: yes    Referring Physician:  Dr. Cardenas for: evaluate and treat-A/AROM    Diagnosis:  Cellulitis right middle finger  Date of Injury: 2025, Surgery: 2025 debridement                (3 weeks and 1 day post-surgery)  Precautions: None noted    Insurance  Medicare  Visit Number:1  Visits Allowed: BMN  Authorization:Not needed  Date Range: n/a    Subjective  Prior level of function:independent  Current level of function: Patient is retired . Patient lives alone. 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  Pain: best 0 /10,  worst 6/10, What makes it worse; motion/better ice ; pain description: sharp, and Location: palm and volar surface middle finger  Chief Complaint: \"Can't move it, swollen, and its ugly\".  Patient's goal for therapy:Regain motion, per patient  Patient's preferred learning style: visual, auditory, read/written, kinesthetic  Outcome Measure:  Initial evaluation Quick DASH 59.09%    Objective  Observation:  Patient to therapy with coban and with multiple steri strips   Clinical presentation: stable  Skin/ Wound/Scar:tender adhered scar  Edema: moderate middle finger  Sensation:tip of finger tingling per patient, intact to light touch  Dexterity/ Coordination: Patient reports difficulty with buttoning, tying , and zipping        Right Hand ROM  AROM     Finger (DPC)        Index Middle Ring Small    DPC 6.0  cm DPC DPC     AROM    Right middle finger  MP  0/90     PIP 0/52    DIP 0/14      Edema:                   Circumference (cm)                             Right middle P1  7.0   PIP 7.0    P2 5.8    P3 5.2                             Left middle_ P1  5.6   PIP  " 5.6   P2  5.0      P3  4.2              Hand Strength-NT RHD                Gross grasp(dynamometer) (lbs)                             (2nd setting) Right         Left                Pinch (lbs)                             Key  right  left                            Stephenson   right     left    ADLS/IADLS:  modified      Treatment Completed this Date: Patient instructed in tendon gliding, differential glides, and blocking exercises. She was also instructed in scar mobilization. Patient encouraged to soak her hand to get dead skin off. She was fitted compression finger sleeve and digi cap with purpose, wearing schedule ,precautions, and care discussed. Patient was fitted with trappers for AAROM exercises she was instructed in. She demonstrated all exercises correctly with follow through expected.      HEP-see treatment    Assessment  Patient is a 66 y/o right hand dominate female status post cat bite on 6/19/2025. Patient diagnosed with cellulitis right middle finger and is 3 weeks  and 1 day post-operative debridement on 6/22/2025. Patient reports pain and decreased independence with ADLs/ IADLs. Patient demonstrates decreased ROM, edema, tender and adhered scar and decreased independence with ADLs/IADLs  per Quick DASH score. Patient will benefit from attending occupational therapy to improve functional use of her right   hand.     Plan of Care  Goals to be achieved by 6  weeks    Patient's level of independence with ADLs/ IADLs will improve by at least 50% per the quick DASH by discharge.    Patient will demonstrate full fist and full extension of right middle finger to improve participation in ADLs/IADLs by discharge.    Patient will report understanding of home program, demonstrate independence and verbalize precautions.    Patient will report pain free use of hand for completing ADLs/IADLS by discharge.    Patient's scar will be supple and demonstrate good healing that does not limit function by discharge.      Patient's gross grasp strength, per Dynamometer 2nd setting, will be within 5# of non-affected hand to complete ADLs/IADLs with increased independence by discharge.    Patient's pinch strengths will improve per pinch gauge to improve functional pinch strength for completing ADLs/IADLs by discharge.    Intervention    Therapeutic exercises, Therapeutic activity, manual therapy, orthosis, fluidotherapy, paraffin, taping, patient education, home program    Rehabilitation Potential:  good            Potential limiting factors for rehabilitation: none    Plan  Frequency: 1-2/week  Duration: 6 weeks    Patient and therapist developed goals for therapy and patient verbalizing agreement to plan of care

## 2025-07-16 ENCOUNTER — OFFICE VISIT (OUTPATIENT)
Dept: WOUND CARE | Facility: CLINIC | Age: 65
End: 2025-07-16
Payer: MEDICARE

## 2025-07-17 ENCOUNTER — TREATMENT (OUTPATIENT)
Dept: OCCUPATIONAL THERAPY | Facility: CLINIC | Age: 65
End: 2025-07-17
Payer: MEDICARE

## 2025-07-17 DIAGNOSIS — L03.011 CELLULITIS OF FINGER OF RIGHT HAND: ICD-10-CM

## 2025-07-17 PROCEDURE — 97110 THERAPEUTIC EXERCISES: CPT | Mod: GO

## 2025-07-17 NOTE — PROGRESS NOTES
"Occupational Therapy Upper Extremity Evaluation Note    Date: 2025    Time In:1335  Time Out: 1415  Total Time:  40 minutes    Charges:    HC OT THERAPEUTIC EXERCISES  41082 (CPT®) 40  minutes 3 units    NAME: Coretta Bird  : 1960  MRN: [id number]    Chart reviewed: yes    Referring Physician:  Dr. Cardenas for: evaluate and treat-A/AROM    Diagnosis:  Cellulitis right middle finger  Date of Injury: 2025, Surgery: 2025 debridement                (3 weeks and 4 days post-surgery)  Precautions: None noted    Insurance  Medicare  Visit Number:2  Visits Allowed: BMN  Authorization:Not needed  Date Range: n/a    Subjective  Patient reports her finger turns purple  with compression sleeve and trappers on. She reports follow through with home program.    At initial evaluation:  Prior level of function:independent  Current level of function: Patient is retired . Patient lives alone. 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  Pain: best 0 /10,  worst 6-7/10, What makes it worse; motion/better ice ; pain description: sharp, and Location: palm and volar surface middle finger  Chief Complaint: \"Can't move it, swollen, and its ugly\".  Patient's goal for therapy:Regain motion, per patient  Patient's preferred learning style: visual, auditory, read/written, kinesthetic  Outcome Measure:  Initial evaluation Quick DASH 59.09%    Objective  Observation:  Patient to therapy with compression sleeve and trappers.  Clinical presentation: stable  Skin/ Wound/Scar:tender adhered scar- red and dead skin nearly gone.  Edema: moderate middle finger  Sensation:tip of finger tingling per patient, intact to light touch  Dexterity/ Coordination: Patient reports difficulty with buttoning, tying , and zipping        Right Hand ROM  AROM     Finger (DPC)        Index Middle Ring Small    DPC 2.3* cm DPC DPC     AROM    Right middle finger  MP  0/90     PIP 0/86   DIP 0/52      Edema:                   Circumference (cm)               "               Right middle P1  7.0   PIP 6.6*    P2 5.8   P3 5.0*                             Left middle_ P1  5.6   PIP  5.6   P2  5.0     P3  4.2              Hand Strength-NT RHD                Gross grasp(dynamometer) (lbs)                             (2nd setting) Right         Left                Pinch (lbs)                             Key  right  left                            Stephenson   right     left    ADLS/IADLS:  modified      Treatment Completed this Date: ROM and edema re-evaluated and discussed findings with patient. She made gains in composite flexion and edema reduction. She was instructed in intrinsic stretches with trapper. She received fluidotherapy in conjunction with completing ASL to improve ROM. Patient completed finger translation and fine motor exercises with grooved pegboard and tweezers board. She was issued dycem and instructed in scar mobilization. She was issued a small ball soft, and soft tan putty for gentle gripping and rolling to message her scar.AROM at end of session this date for her  middle finger  MP  0/90,  PIP 0/96,   DIP 0/56 Patient able to touch her palm with her middle finger.  Reviewed home program. Instructed patient in watching her skin color and to remove compression sleeve/trappers with color change- both compression sleeve and trappers are no tight on her finger.      HEP-see treatment    Assessment  Patient is a 64 y/o right hand dominate female status post cat bite on 6/19/2025. Patient diagnosed with cellulitis right middle finger and is 3 weeks  and 4 day post-operative debridement on 6/22/2025. Patient reports follow through with home program. She demonstrates significant improvement in her middle finger ROM, especially at end of therapy session. She also made slight gains in edema reduction. She tolerated all exercises well and is progressing nicely. Patient will benefit from  continuing to attend occupational therapy to improve functional use of her right  hand.     Plan of Care  Goals to be achieved by 6  weeks    Patient's level of independence with ADLs/ IADLs will improve by at least 50% per the quick DASH by discharge.    Patient will demonstrate full fist and full extension of right middle finger to improve participation in ADLs/IADLs by discharge.    Patient will report understanding of home program, demonstrate independence and verbalize precautions.    Patient will report pain free use of hand for completing ADLs/IADLS by discharge.    Patient's scar will be supple and demonstrate good healing that does not limit function by discharge.     Patient's gross grasp strength, per Dynamometer 2nd setting, will be within 5# of non-affected hand to complete ADLs/IADLs with increased independence by discharge.    Patient's pinch strengths will improve per pinch gauge to improve functional pinch strength for completing ADLs/IADLs by discharge.    Intervention    Therapeutic exercises, Therapeutic activity, manual therapy, orthosis, fluidotherapy, paraffin, taping, patient education, home program    Rehabilitation Potential:  good            Potential limiting factors for rehabilitation: none    Plan  Frequency: 1-2/week  Duration: 6 weeks    Patient and therapist developed goals for therapy and patient verbalizing agreement to plan of care

## 2025-07-22 ENCOUNTER — TREATMENT (OUTPATIENT)
Dept: OCCUPATIONAL THERAPY | Facility: CLINIC | Age: 65
End: 2025-07-22
Payer: MEDICARE

## 2025-07-22 DIAGNOSIS — L03.011 CELLULITIS OF FINGER OF RIGHT HAND: ICD-10-CM

## 2025-07-22 PROCEDURE — 97110 THERAPEUTIC EXERCISES: CPT | Mod: GO

## 2025-07-22 NOTE — PROGRESS NOTES
"Occupational Therapy Upper Extremity Progress Note    Date: 2025    Time In:1018  Time Out: 1105  Total Time: 47 minutes    Charges:    HC OT THERAPEUTIC EXERCISES  42565 (CPT®)  47  minutes 3 units    NAME: Coretta Bird  : 1960  MRN: [id number]    Chart reviewed: yes    Referring Physician:  Dr. Cardenas for: evaluate and treat-A/AROM    Diagnosis:  Cellulitis right middle finger  Date of Injury: 2025, Surgery: 2025 debridement                (4 weeks and 2 days post-surgery)  Precautions: None noted    Insurance  Medicare  Visit Number:3  Visits Allowed: BMN  Authorization:Not needed  Date Range: n/a    Subjective  \"I'm using my right hand more and pushed grocery cart with it\".  \"My finger is very stiff.\"  \" I lost my finger sleeve\". She reports follow through with home program and improvement in worst pain , now 5/10.    At initial evaluation:  Prior level of function:independent  Current level of function: Patient is retired . Patient lives alone. 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  Pain: best 0 /10,  worst 6-7/10, What makes it worse; motion/better ice ; pain description: sharp, and Location: palm and volar surface middle finger  Chief Complaint: \"Can't move it, swollen, and its ugly\".  Patient's goal for therapy:Regain motion, per patient  Patient's preferred learning style: visual, auditory, read/written, kinesthetic  Outcome Measure:  Initial evaluation Quick DASH 59.09%    Objective  Observation:  Patient to therapy with without compression sleeve on  Clinical presentation: stable  Skin/ Wound/Scar:tender adhered scar- red and beginning to fade.   Edema: moderate middle finger  Sensation:tip of finger tingling per patient, intact to light touch  Dexterity/ Coordination: Patient reports difficulty with buttoning, tying , and zipping        Right Hand ROM  AROM     Finger (DPC)        Index Middle Ring Small    DPC 1.8* cm DPC DPC     AROM    Right middle finger  MP  0/90     PIP 0/100* "  DIP 0/72*    Edema:                   Circumference (cm)                             Right middle P1  7.0   PIP 7.0  (was 6.6)   P2  ( was 5.8 )  P3  4.8*                             Left middle_ P1  5.6   PIP  5.6   P2  5.0     P3  4.2              Hand Strength- RHD                Gross grasp(dynamometer) (lbs)                             (2nd setting) Right   25  Left 42                 Pinch (lbs)                             Key  right 12   left 10                            Dodd   right 8      left 12    ADLS/IADLS:  modified      Treatment Completed this Date: ROM and edema re-evaluated and hand strength evaluated. Discussed findings with patient. She made gains in composite flexion.  She was re- instructed in intrinsic stretches and completed stretches and ASL while her hand was in fluidotherapy. She was fitted with new  compression sleeve and a digi cap. Upgraded theraputty resistance for home program and patient completed putty exercises with good tolerance. Provided scar mobilization with IASTM. She was fitted with compression glove and also theraband loop for intrinsic stretches. Purpose, wearing schedule, precautions discussed for both. Reviewed home program.    HEP-see treatment    Assessment  Patient is a 66 y/o right hand dominate female status post cat bite on 6/19/2025. Patient diagnosed with cellulitis right middle finger and is 4 weeks and 2 days post-operative debridement on 6/22/2025. Patient reports follow through with home program. Hand strength evaluated with decreased  and dodd pinch noted compared to her left middle finger.  She tolerated increased resistive theraputty and scar mobilization this date. Patient will benefit from  continuing to attend occupational therapy to improve functional use of her right hand.     Plan of Care  Goals to be achieved by 6  weeks    Patient's level of independence with ADLs/ IADLs will improve by at least 50% per the quick DASH by  discharge.    Patient will demonstrate full fist and full extension of right middle finger to improve participation in ADLs/IADLs by discharge.    Patient will report understanding of home program, demonstrate independence and verbalize precautions.    Patient will report pain free use of hand for completing ADLs/IADLS by discharge.    Patient's scar will be supple and demonstrate good healing that does not limit function by discharge.     Patient's gross grasp strength, per Dynamometer 2nd setting, will be within 5# of non-affected hand to complete ADLs/IADLs with increased independence by discharge.    Patient's pinch strengths will improve per pinch gauge to improve functional pinch strength for completing ADLs/IADLs by discharge.    Intervention    Therapeutic exercises, Therapeutic activity, manual therapy, orthosis, fluidotherapy, paraffin, taping, patient education, home program    Rehabilitation Potential:  good            Potential limiting factors for rehabilitation: none    Plan  Frequency: 1-2/week  Duration: 6 weeks    Patient and therapist developed goals for therapy and patient verbalizing agreement to plan of care

## 2025-07-24 ENCOUNTER — TREATMENT (OUTPATIENT)
Dept: OCCUPATIONAL THERAPY | Facility: CLINIC | Age: 65
End: 2025-07-24
Payer: MEDICARE

## 2025-07-24 DIAGNOSIS — L03.011 CELLULITIS OF FINGER OF RIGHT HAND: ICD-10-CM

## 2025-07-24 PROCEDURE — 97018 PARAFFIN BATH THERAPY: CPT | Mod: GO

## 2025-07-24 PROCEDURE — 97110 THERAPEUTIC EXERCISES: CPT | Mod: GO

## 2025-07-24 PROCEDURE — 97140 MANUAL THERAPY 1/> REGIONS: CPT | Mod: GO

## 2025-07-24 NOTE — PROGRESS NOTES
"Occupational Therapy Upper Extremity Progress Note    Date: 2025    Time In:1018  Time Out: 1100  Total Time: 42 minutes    Charges:  HC OT MANUAL THER TECH,1+REGIONS,EA 15 MIN  87139 (CPT®) 12 minutes 1 unit  HC OT THERAPEUTIC EXERCISES  84394 (CPT®)  25 minutes 2 units    HC OT PARAFFIN BATH THERAPY     NAME: Coretta Bird  : 1960  MRN: [id number]    Chart reviewed: yes    Referring Physician:  Dr. Cardenas for: evaluate and treat-A/AROM    Diagnosis:  Cellulitis right middle finger  Date of Injury: 2025, Surgery: 2025 debridement                (4 weeks and 4 days post-surgery)  Precautions: None noted    Insurance  Medicare  Visit Number:4  Visits Allowed: BMN  Authorization:Not needed  Date Range: n/a    Subjective  \"It  seems my finger is more supple\".    At initial evaluation:  Prior level of function:independent  Current level of function: Patient is retired . Patient lives alone. 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  Pain: best 0 /10,  worst 6-7/10, What makes it worse; motion/better ice ; pain description: sharp, and Location: palm and volar surface middle finger  Chief Complaint: \"Can't move it, swollen, and its ugly\".  Patient's goal for therapy:Regain motion, per patient  Patient's preferred learning style: visual, auditory, read/written, kinesthetic  Outcome Measure:  Initial evaluation Quick DASH 59.09%    Objective  Observation:  Patient to therapy with with compression sleeve on  Clinical presentation: stable  Skin/ Wound/Scar:tender adhered scar- red and beginning to fade.   Edema: moderate middle finger  Sensation:tip of finger tingling per patient, intact to light touch  Dexterity/ Coordination: Patient reports difficulty with buttoning, tying , and zipping        Right Hand ROM  AROM     Finger (DPC)        Index Middle Ring Small    DPC 1.8* cm DPC DPC     AROM    Right middle finger  MP  0/90     PIP 0/100*  DIP 0/72*    Edema:                   Circumference (cm)           "                   Right middle P1  6.5  PIP 7.0  (was 6.6)   P2 6.0 ( was 5.8 )  P3  5.8                             Left middle_ P1  5.6   PIP  5.6   P2  5.0     P3  4.2              Hand Strength- RHD                Gross grasp(dynamometer) (lbs)                             (2nd setting) Right   25  Left 42                 Pinch (lbs)                             Key  right 12   left 10                            Stephenson   right 8      left 12    ADLS/IADLS:  modified      Treatment Completed this Date: Edema re-evaluated with some improvement  She received paraffin in conjunction with PROM to middle finger. This was followed by scar mobilization and soft tissue mobilization to soft tissue restriction right middle finger. She was instructed in string wrapping for edema reduction and completed correctly. Patient touching her palm at end of session with her middle finger near DPC. Reviewed home program.    HEP-see treatment    Assessment  Patient is a 64 y/o right hand dominate female status post cat bite on 6/19/2025. Patient diagnosed with cellulitis right middle finger and is 4 weeks and 4 days post-operative debridement on 6/22/2025. Patient reports follow through with home program.  Focused on ROM, edema reduction and scar management. She tolerated all exercises making gains in composite flexion at end of therapy session. Patient will benefit from  continuing to attend occupational therapy to improve functional use of her right hand.     Plan of Care  Goals to be achieved by 6  weeks    Patient's level of independence with ADLs/ IADLs will improve by at least 50% per the quick DASH by discharge.    Patient will demonstrate full fist and full extension of right middle finger to improve participation in ADLs/IADLs by discharge.PM    Patient will report understanding of home program, demonstrate independence and verbalize precautions.PM    Patient will report pain free use of hand for completing ADLs/IADLS by  discharge.    Patient's scar will be supple and demonstrate good healing that does not limit function by discharge.     Patient's gross grasp strength, per Dynamometer 2nd setting, will be within 5# of non-affected hand to complete ADLs/IADLs with increased independence by discharge.    Patient's pinch strengths will improve per pinch gauge to improve functional pinch strength for completing ADLs/IADLs by discharge.    Intervention    Therapeutic exercises, Therapeutic activity, manual therapy, orthosis, fluidotherapy, paraffin, taping, patient education, home program    Rehabilitation Potential:  good            Potential limiting factors for rehabilitation: none    Plan  Frequency: 1-2/week  Duration: 6 weeks    Patient and therapist developed goals for therapy and patient verbalizing agreement to plan of care

## 2025-07-29 ENCOUNTER — TREATMENT (OUTPATIENT)
Dept: OCCUPATIONAL THERAPY | Facility: CLINIC | Age: 65
End: 2025-07-29
Payer: MEDICARE

## 2025-07-29 DIAGNOSIS — L03.011 CELLULITIS OF FINGER OF RIGHT HAND: ICD-10-CM

## 2025-07-29 PROCEDURE — 97018 PARAFFIN BATH THERAPY: CPT | Mod: GO

## 2025-07-29 PROCEDURE — 97110 THERAPEUTIC EXERCISES: CPT | Mod: GO

## 2025-07-29 PROCEDURE — 97140 MANUAL THERAPY 1/> REGIONS: CPT | Mod: GO

## 2025-07-29 NOTE — PROGRESS NOTES
"Occupational Therapy Upper Extremity Progress Note    Date: 2025    Time In:1017  Time Out: 1058  Total Time:  41 minutes    Charges:  HC OT MANUAL THER TECH,1+REGIONS,EA 15 MIN  18196 (CPT®)13 minutes  unit  HC OT THERAPEUTIC EXERCISES  08965 (CPT®) 23  minutes units    HC OT PARAFFIN BATH THERAPY     NAME: Coretta Bird  : 1960  MRN: [id number]    Chart reviewed: yes    Referring Physician:  Dr. Cardenas for: evaluate and treat-A/AROM    Diagnosis:  Cellulitis right middle finger  Date of Injury: 2025, Surgery: 2025 debridement                (5 weeks and 2 days post-surgery)  Precautions: None noted    Insurance  Medicare  Visit Number:5  Visits Allowed: BMN  Authorization:Not needed  Date Range: n/a    Subjective  \"My finger is still swollen. I wore  the compression.\"    At initial evaluation:  Prior level of function:independent  Current level of function: Patient is retired . Patient lives alone. 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  Pain: best 0 /10,  worst 6-7/10, What makes it worse; motion/better ice ; pain description: sharp, and Location: palm and volar surface middle finger  Chief Complaint: \"Can't move it, swollen, and its ugly\".  Patient's goal for therapy:Regain motion, per patient  Patient's preferred learning style: visual, auditory, read/written, kinesthetic  Outcome Measure:  Initial evaluation Quick DASH 59.09%    Objective  Observation:  Patient to therapy with with compression sleeve on  Clinical presentation: stable  Skin/ Wound/Scar:tender adhered scar- red and beginning to fade.   Edema: moderate middle finger  Sensation:tip of finger tingling per patient, intact to light touch  Dexterity/ Coordination: Patient reports difficulty with buttoning, tying , and zipping        Right Hand ROM  AROM     Finger (DPC)        Index Middle Ring Small    DPC 1.8* cm DPC DPC     AROM    Right middle finger  MP  0/90     PIP 0/100*  DIP 0/72*    Edema:                   Circumference " (cm)                             Right middle P1  6.5  PIP 7.0  (was 6.6)   P2 6.0 ( was 5.8 )  P3  5.8                             Left middle_ P1  5.6   PIP  5.6   P2  5.0     P3  4.2              Hand Strength- RHD                Gross grasp(dynamometer) (lbs)                             (2nd setting) Right   25  Left 42                 Pinch (lbs)                             Key  right 12   left 10                            Stephenson   right 8      left 12    ADLS/IADLS:  modified      Treatment Completed this Date:  She received paraffin in conjunction with PROM to middle finger. Full PROM flexion achieved for her right middle finger. This was followed by scar mobilization and soft tissue mobilization to soft tissue restriction right middle finger. Elastomer fabricated for raised scar with purpose, wearing schedule, precautions, and care discussed . Patient touching her palm at end of session with her middle finger near DPC. Reviewed home program.    HEP-see treatment    Assessment  Patient is a 64 y/o right hand dominate female status post cat bite on 6/19/2025. Patient diagnosed with cellulitis right middle finger and is 5 weeks and 2 days post-operative debridement on 6/22/2025. Patient reports follow through with home program.  Focused on ROM  and scar management. She tolerated all exercises making gains in PROM composite flexion at end of therapy session. Patient will benefit from continuing to attend occupational therapy to improve functional use of her right hand.     Plan of Care  Goals to be achieved by 6  weeks    Patient's level of independence with ADLs/ IADLs will improve by at least 50% per the quick DASH by discharge.    Patient will demonstrate full fist and full extension of right middle finger to improve participation in ADLs/IADLs by discharge.PM    Patient will report understanding of home program, demonstrate independence and verbalize precautions.PM    Patient will report pain free use of  hand for completing ADLs/IADLS by discharge.    Patient's scar will be supple and demonstrate good healing that does not limit function by discharge.     Patient's gross grasp strength, per Dynamometer 2nd setting, will be within 5# of non-affected hand to complete ADLs/IADLs with increased independence by discharge.    Patient's pinch strengths will improve per pinch gauge to improve functional pinch strength for completing ADLs/IADLs by discharge.    Intervention    Therapeutic exercises, Therapeutic activity, manual therapy, orthosis, fluidotherapy, paraffin, taping, patient education, home program    Rehabilitation Potential:  good            Potential limiting factors for rehabilitation: none    Plan  Frequency: 1-2/week  Duration: 6 weeks    Patient and therapist developed goals for therapy and patient verbalizing agreement to plan of care

## 2025-07-29 NOTE — PROGRESS NOTES
"Occupational Therapy Upper Extremity Progress Note    Date: 2025    Time In:1018  Time Out: 1100  Total Time: 42 minutes    Charges:  HC OT MANUAL THER TECH,1+REGIONS,EA 15 MIN  89759 (CPT®) 12 minutes 1 unit  HC OT THERAPEUTIC EXERCISES  57802 (CPT®)  25 minutes 2 units    HC OT PARAFFIN BATH THERAPY     NAME: Coretta Bird  : 1960  MRN: [id number]    Chart reviewed: yes    Referring Physician:  Dr. Cardenas for: evaluate and treat-A/AROM    Diagnosis:  Cellulitis right middle finger  Date of Injury: 2025, Surgery: 2025 debridement                (4 weeks and 4 days post-surgery)  Precautions: None noted    Insurance  Medicare  Visit Number:4  Visits Allowed: BMN  Authorization:Not needed  Date Range: n/a    Subjective  \"It  seems my finger is more supple\".    At initial evaluation:  Prior level of function:independent  Current level of function: Patient is retired . Patient lives alone. 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  Pain: best 0 /10,  worst 6-7/10, What makes it worse; motion/better ice ; pain description: sharp, and Location: palm and volar surface middle finger  Chief Complaint: \"Can't move it, swollen, and its ugly\".  Patient's goal for therapy:Regain motion, per patient  Patient's preferred learning style: visual, auditory, read/written, kinesthetic  Outcome Measure:  Initial evaluation Quick DASH 59.09%    Objective  Observation:  Patient to therapy with with compression sleeve on  Clinical presentation: stable  Skin/ Wound/Scar:tender adhered scar- red and beginning to fade.   Edema: moderate middle finger  Sensation:tip of finger tingling per patient, intact to light touch  Dexterity/ Coordination: Patient reports difficulty with buttoning, tying , and zipping        Right Hand ROM  AROM     Finger (DPC)        Index Middle Ring Small    DPC 1.8* cm DPC DPC     AROM    Right middle finger  MP  0/90     PIP 0/100*  DIP 0/72*    Edema:                   Circumference (cm)           "                   Right middle P1  6.5  PIP 7.0  (was 6.6)   P2 6.0 ( was 5.8 )  P3  5.8                             Left middle_ P1  5.6   PIP  5.6   P2  5.0     P3  4.2              Hand Strength- RHD                Gross grasp(dynamometer) (lbs)                             (2nd setting) Right   25  Left 42                 Pinch (lbs)                             Key  right 12   left 10                            Stephenson   right 8      left 12    ADLS/IADLS:  modified      Treatment Completed this Date: Edema re-evaluated with some improvement  She received paraffin in conjunction with PROM to middle finger. This was followed by scar mobilization and soft tissue mobilization to soft tissue restriction right middle finger. She was instructed in string wrapping for edema reduction and completed correctly. Patient touching her palm at end of session with her middle finger near DPC. Reviewed home program.    HEP-see treatment    Assessment  Patient is a 66 y/o right hand dominate female status post cat bite on 6/19/2025. Patient diagnosed with cellulitis right middle finger and is 4 weeks and 4 days post-operative debridement on 6/22/2025. Patient reports follow through with home program.  Focused on ROM, edema reduction and scar management. She tolerated all exercises making gains in composite flexion at end of therapy session. Patient will benefit from  continuing to attend occupational therapy to improve functional use of her right hand.     Plan of Care  Goals to be achieved by 6  weeks    Patient's level of independence with ADLs/ IADLs will improve by at least 50% per the quick DASH by discharge.    Patient will demonstrate full fist and full extension of right middle finger to improve participation in ADLs/IADLs by discharge.PM    Patient will report understanding of home program, demonstrate independence and verbalize precautions.PM    Patient will report pain free use of hand for completing ADLs/IADLS by  discharge.    Patient's scar will be supple and demonstrate good healing that does not limit function by discharge.     Patient's gross grasp strength, per Dynamometer 2nd setting, will be within 5# of non-affected hand to complete ADLs/IADLs with increased independence by discharge.    Patient's pinch strengths will improve per pinch gauge to improve functional pinch strength for completing ADLs/IADLs by discharge.    Intervention    Therapeutic exercises, Therapeutic activity, manual therapy, orthosis, fluidotherapy, paraffin, taping, patient education, home program    Rehabilitation Potential:  good            Potential limiting factors for rehabilitation: none    Plan  Frequency: 1-2/week  Duration: 6 weeks    Patient and therapist developed goals for therapy and patient verbalizing agreement to plan of care

## 2025-08-04 ENCOUNTER — APPOINTMENT (OUTPATIENT)
Dept: ORTHOPEDIC SURGERY | Facility: CLINIC | Age: 65
End: 2025-08-04
Payer: MEDICARE

## 2025-08-04 DIAGNOSIS — L03.011 CELLULITIS OF FINGER OF RIGHT HAND: ICD-10-CM

## 2025-08-04 PROCEDURE — 99024 POSTOP FOLLOW-UP VISIT: CPT | Performed by: ORTHOPAEDIC SURGERY

## 2025-08-04 PROCEDURE — 1159F MED LIST DOCD IN RCRD: CPT | Performed by: ORTHOPAEDIC SURGERY

## 2025-08-04 NOTE — PROGRESS NOTES
History of Present Illness  Chief Complaint   Patient presents with    Right Hand - Follow-up     ALEJANDRA 6/22/25       Patient continuing to undergo occupational/hand therapy of the right hand.  Reports persistent swelling about the right middle finger.  Pain is improving.    Medical History[1]    Medication Documentation Review Audit       Reviewed by Anisa Bernardo MA (Medical Assistant) on 08/04/25 at 1100      Medication Order Taking? Sig Documenting Provider Last Dose Status   buPROPion XL (Wellbutrin XL) 150 mg 24 hr tablet [id number] No Take 1 tablet (150 mg) by mouth once daily. Holly Ramey MD Taking Active   cholecalciferol, vitamin D3, 250 mcg (10,000 unit) tablet [id number] No Take by mouth once daily. Holly Ramey MD Taking Active   clonazePAM (KlonoPIN) 0.5 mg tablet [id number] No Take by mouth 2 times a day as needed for anxiety. Historical MD Tanvir Taking Active   diphenhydrAMINE (BENADryl) 25 mg capsule [id number]  Take 2 capsules (50 mg) by mouth every 6 hours if needed for itching or rash Karl Chauhan, DO  Active   DULoxetine (Cymbalta) 30 mg DR capsule [id number] No Take 1 capsule (30 mg) by mouth once daily. Historical MD Tanvri Taking Active   DULoxetine (Cymbalta) 60 mg DR capsule [id number] No Take 1 capsule (60 mg) by mouth once daily. Historical MD Tanvir Taking Active   famotidine (Pepcid) 20 mg tablet [id number]  Take 1 tablet (20 mg) by mouth once daily as needed for heartburn (allergies, rash). Karl Chauhan DO  Active   hydrOXYzine HCL (Atarax) 25 mg tablet [id number] No Take 1 tablet (25 mg) by mouth once daily at bedtime. Historical MD Tanvir Taking Active   levothyroxine (Synthroid, Levoxyl) 25 mcg tablet [id number]  Take 1 tablet (25 mcg) by mouth early in the morning.. Take on an empty stomach at the same time each day, either 30 to 60 minutes prior to breakfast Historical MD Tanvir  Active   rosuvastatin (Crestor) 5 mg tablet [id number]  Take 1 tablet (5 mg) by  mouth once daily. Historical Provider, MD  Active                    RX Allergies[2]    Social History     Socioeconomic History    Marital status:      Spouse name: Not on file    Number of children: Not on file    Years of education: Not on file    Highest education level: Not on file   Occupational History    Not on file   Tobacco Use    Smoking status: Former     Types: Cigarettes    Smokeless tobacco: Never   Vaping Use    Vaping status: Never Used   Substance and Sexual Activity    Alcohol use: Not Currently    Drug use: Never    Sexual activity: Not on file   Other Topics Concern    Not on file   Social History Narrative    Not on file     Social Drivers of Health     Financial Resource Strain: Low Risk  (6/20/2025)    Overall Financial Resource Strain (CARDIA)     Difficulty of Paying Living Expenses: Not very hard   Food Insecurity: No Food Insecurity (6/20/2025)    Hunger Vital Sign     Worried About Running Out of Food in the Last Year: Never true     Ran Out of Food in the Last Year: Never true   Transportation Needs: No Transportation Needs (6/20/2025)    PRAPARE - Transportation     Lack of Transportation (Medical): No     Lack of Transportation (Non-Medical): No   Physical Activity: Sufficiently Active (7/21/2024)    Received from [facility]    Exercise Vital Sign     On average, how many days per week do you engage in moderate to strenuous exercise (like a brisk walk)?: 4 days     On average, how many minutes do you engage in exercise at this level?: 40 min   Stress: No Stress Concern Present (7/21/2024)    Received from [facility]    [employer] - Occupational Stress Questionnaire     Feeling of Stress : Only a little   Social Connections: Unknown (7/21/2024)    Received from [facility]    Social Connection and Isolation Panel     In a typical week, how many times do you talk on the phone with family, friends, or neighbors?: Three times a week      How often do you get together with friends or relatives?: Three times a week     How often do you attend Adventist or Congregation services?: More than 4 times per year     Active Member of Clubs or Organizations: Not on file     How often do you attend meetings of the clubs or organizations you belong to?: Patient declined     Are you , , , , never , or living with a partner?:    Intimate Partner Violence: Not At Risk (6/20/2025)    Humiliation, Afraid, Rape, and Kick questionnaire     Fear of Current or Ex-Partner: No     Emotionally Abused: No     Physically Abused: No     Sexually Abused: No   Housing Stability: Low Risk  (6/20/2025)    Housing Stability Vital Sign     Unable to Pay for Housing in the Last Year: No     Number of Times Moved in the Last Year: 1     Homeless in the Last Year: No       Surgical History[3]         Review of Systems   GENERAL: Negative for malaise, significant weight loss, fever  MUSCULOSKELETAL: see HPI  NEURO:  Negative      Exam  Right middle finger: Persistent swelling however substantially decreased compared to prior examination.  Patient able to nearly make a full fist compared to the contralateral side.     Imaging  None       Assessment  Continue resolution status post irrigation debridement of right middle finger infection     Plan  Patient progressing nicely overall would recommend continued hand therapy to occur over at least the next several months to allow for maximal functional return of the right hand.  Patient should continue use compression dressings on the middle finger however wound has healed completely at this time and may utilize what ever compressive wrap or technique would be appropriate in coordination with hand therapy.  Recommend follow-up in 1 month for repeat clinical assessment.          [1]   Past Medical History:  Diagnosis Date    Personal history of other diseases of the musculoskeletal system and connective  tissue     History of arthritis    Personal history of other diseases of the musculoskeletal system and connective tissue     History of backache    Personal history of other mental and behavioral disorders     History of depression   [2]   Allergies  Allergen Reactions    Doxycycline GI Upset     Nausea, upset stomach    Erythromycin GI Upset     Nausea, upset stomach    Sulfamethoxazole Rash   [3]   Past Surgical History:  Procedure Laterality Date    APPENDECTOMY  11/08/2016    Appendectomy    VARICOSE VEIN SURGERY  11/08/2016    Varicose Vein Ligation

## 2025-08-05 ENCOUNTER — TREATMENT (OUTPATIENT)
Dept: OCCUPATIONAL THERAPY | Facility: CLINIC | Age: 65
End: 2025-08-05
Payer: MEDICARE

## 2025-08-05 DIAGNOSIS — L03.011 CELLULITIS OF FINGER OF RIGHT HAND: ICD-10-CM

## 2025-08-05 PROCEDURE — 97140 MANUAL THERAPY 1/> REGIONS: CPT | Mod: GO

## 2025-08-05 PROCEDURE — 97018 PARAFFIN BATH THERAPY: CPT | Mod: GO

## 2025-08-05 PROCEDURE — 97110 THERAPEUTIC EXERCISES: CPT | Mod: GO

## 2025-08-05 NOTE — PROGRESS NOTES
"Occupational Therapy Upper Extremity Progress/Re-evaluation Note    Date: 2025    Time In:1102  Time Out: 1147  Total Time: 45 minutes    Charges:  [facility] OT MANUAL THER TECH,1+REGIONS,EA 15 MIN  24068 (CPT®) 30 minutes  unit  HC OT THERAPEUTIC EXERCISES  46503 (CPT®) 10  minutes units    HC OT PARAFFIN BATH THERAPY     NAME: Coretta Bird  : 1960  MRN: [id number]    Chart reviewed: yes    Referring Physician:  Dr. Cardenas for: evaluate and treat-A/AROM    Diagnosis:  Cellulitis right middle finger  Date of Injury: 2025, Surgery: 2025 debridement                ( 7 weeks post-surgery)  Precautions: None noted    Insurance  Medicare  Visit Number:6  Visits Allowed: BMN  Authorization:Not needed  Date Range: n/a    Subjective  \"Patient reports follow through with home.\"    At initial evaluation:  Prior level of function:independent  Current level of function: Patient is retired . Patient lives alone. 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  Pain: best 0 /10,  worst 6-7/10, What makes it worse; motion/better ice ; pain description: sharp, and Location: palm and volar surface middle finger  Chief Complaint: \"Can't move it, swollen, and its ugly\".  Patient's goal for therapy:Regain motion, per patient  Patient's preferred learning style: visual, auditory, read/written, kinesthetic  Outcome Measure:  Initial evaluation Quick DASH 59.09%    Objective  Observation:  Patient to therapy with with compression sleeve on  Clinical presentation: stable  Skin/ Wound/Scar:tender adhered scar- red and beginning to fade.   Edema: moderate middle finger  Sensation:tip of finger tingling per patient, intact to light touch  Dexterity/ Coordination: Patient reports difficulty with buttoning, tying , and zipping        Right Hand ROM  AROM     Finger (DPC)        Index Middle Ring Small    DPC DPC  DPC DPC     AROM    Right middle finger  MP  0/90   PIP 0/102*  DIP 0/76*                 Left middle    finger  MP 0/100  PIP  " 0/102     DIP 0/90  Edema:                   Circumference (cm)                             Right middle P1  6.5  PIP 7.0  (was 6.6)   P2 6.0 ( was 5.8 )  P3  5.8                             Left middle_ P1  5.6   PIP  5.6   P2  5.0     P3  4.2              Hand Strength- RHD                Gross grasp(dynamometer) (lbs)                             (2nd setting) Right   25  Left 42                 Pinch (lbs)                             Key  right 12   left 10                            Stephenson   right 8      left 12    ADLS/IADLS:  modified      Treatment Completed this Date:  She received paraffin in conjunction with PROM to middle finger. Full PROM flexion achieved again for her right middle finger. AROM and PROM re-evaluated with nice gains noted.This was followed by scar mobilization and soft tissue mobilization to soft tissue restriction right middle finger with IASTM. New  Elastomer fabricated for raised scar finger and palm with purpose, wearing schedule, precautions, and care reviewed . Patient fully touching her palm at end of session with her middle finger at DPC. Reviewed home program.    HEP-see treatment    Assessment  Patient is a 64 y/o right hand dominate female status post cat bite on 6/19/2025. Patient diagnosed with cellulitis right middle finger and is 7 weeks  post-operative debridement on 6/22/2025. Patient reports follow through with home program.  Her ROM has progressed nicely.  She is progressing towards meeting OT related goals.She is still limited by edema, thick adhered raised scar, and decreased hand strength. She tolerated all exercises this date and will benefit from 4 additional appointments for optimal functional gains for fully using his hand. Patient will benefit from continuing to attend occupational therapy to improve functional use of her right hand.     Plan of Care  Goals to be achieved by 10 weeks    Patient's level of independence with ADLs/ IADLs will improve by at least  50% per the quick DASH by discharge.    Patient will demonstrate full fist and full extension of right middle finger to improve participation in ADLs/IADLs by discharge.M    Patient will report understanding of home program, demonstrate independence and verbalize precautions.PM    Patient will report pain free use of hand for completing ADLs/IADLS by discharge.PM    Patient's scar will be supple and demonstrate good healing that does not limit function by discharge. PM    Patient's gross grasp strength, per Dynamometer 2nd setting, will be within 5# of non-affected hand to complete ADLs/IADLs with increased independence by discharge.PM    Patient's pinch strengths will improve per pinch gauge to improve functional pinch strength for completing ADLs/IADLs by discharge.PM    Intervention    Therapeutic exercises, Therapeutic activity, manual therapy, orthosis, fluidotherapy, paraffin, taping, patient education, home program    Rehabilitation Potential:  good            Potential limiting factors for rehabilitation: none    Plan  Frequency: 1-2/week  Duration: + 4 more weeks    Patient and therapist developed goals for therapy and patient verbalizing agreement to plan of care

## 2025-08-11 ENCOUNTER — TREATMENT (OUTPATIENT)
Dept: OCCUPATIONAL THERAPY | Facility: CLINIC | Age: 65
End: 2025-08-11
Payer: MEDICARE

## 2025-08-11 DIAGNOSIS — L03.011 CELLULITIS OF FINGER OF RIGHT HAND: ICD-10-CM

## 2025-08-11 PROCEDURE — 97018 PARAFFIN BATH THERAPY: CPT | Mod: GO

## 2025-08-11 PROCEDURE — 97110 THERAPEUTIC EXERCISES: CPT | Mod: GO

## 2025-08-18 ENCOUNTER — APPOINTMENT (OUTPATIENT)
Dept: OCCUPATIONAL THERAPY | Facility: CLINIC | Age: 65
End: 2025-08-18
Payer: MEDICARE

## 2025-08-25 ENCOUNTER — APPOINTMENT (OUTPATIENT)
Dept: OCCUPATIONAL THERAPY | Facility: CLINIC | Age: 65
End: 2025-08-25
Payer: MEDICARE

## 2025-09-05 ENCOUNTER — APPOINTMENT (OUTPATIENT)
Dept: ORTHOPEDIC SURGERY | Facility: CLINIC | Age: 65
End: 2025-09-05
Payer: MEDICARE

## (undated) DEVICE — Device

## (undated) DEVICE — CUFF, TOURNIQUET, 18 X 4, DUAL PORT/SNGL BLADDER, DISP, LF

## (undated) DEVICE — IRRIGATION SET, CYSTOSCOPY, REGULATING CLAMP, STRAIGHT, 81 IN

## (undated) DEVICE — COLLECTION/DELIVERY SYSTEM, COPAN ESWAB, REG SIZE SWAB

## (undated) DEVICE — DRESSING, ABDOMINAL, TENDERSORB, 8 X 7-1/2 IN, STERILE

## (undated) DEVICE — DRESSING, GAUZE, PETROLATUM, XEROFORM, 5 X 9 IN, STERILE

## (undated) DEVICE — GOWN, SURGICAL, IMPLT, BACK, XLARGE, XLONG, STERILE

## (undated) DEVICE — STOCKINETTE, IMPERVIOUS, 12 X 48 IN, ACCORDION

## (undated) DEVICE — DRESSING, GAUZE, SPONGE, 12 PLY, CURITY, 4 X 4 IN, RIGID TRAY, STERILE